# Patient Record
Sex: FEMALE | Race: WHITE | Employment: PART TIME | URBAN - METROPOLITAN AREA
[De-identification: names, ages, dates, MRNs, and addresses within clinical notes are randomized per-mention and may not be internally consistent; named-entity substitution may affect disease eponyms.]

---

## 2017-01-17 ENCOUNTER — HOSPITAL ENCOUNTER (OUTPATIENT)
Dept: PHYSICAL THERAPY | Age: 37
Discharge: HOME | End: 2017-01-17
Attending: NURSE PRACTITIONER
Payer: COMMERCIAL

## 2017-01-17 DIAGNOSIS — M54.30 SCIATICA, UNSPECIFIED LATERALITY: ICD-10-CM

## 2017-01-17 PROCEDURE — 97161 PT EVAL LOW COMPLEX 20 MIN: CPT

## 2017-01-17 NOTE — PROGRESS NOTES
Gabrielle Patle  : 1980 [facility]  [address], [address], [place]  [phone]   [phone]        OUTPATIENT PHYSICAL THERAPY:Initial Assessment 2017    ICD-10: Treatment Diagnosis: Lumbago with sciatica, right side (M54.41), Pain in thoracic spine (M54.6)  Precautions/Allergies:   Sulfa (sulfonamide antibiotics) and Sulfamethoxazole-trimethoprim 29 weeks pregnant  Fall Risk Score: 1 (? 5 = High Risk)  MD Orders: Evaluate and Treat MEDICAL/REFERRING DIAGNOSIS:  Sciatica, unspecified laterality [M54.30]   DATE OF ONSET: October   REFERRING PHYSICIAN: Sonny Crawford NP  RETURN PHYSICIAN APPOINTMENT: 17     INITIAL ASSESSMENT:  Ms. Kandace Abraham presents to physical therapy with right sided low back pain that radiates down the leg as well as mid thoracic back pain. Patient is currently 29 weeks pregnant. Physical therapy evaluation demonstrate a decrease in pain and centralization of right sided symptoms with right side glides indicating a posterior derangement with lateral compartment. She also demonstrates a centralization of mid thoracic back pain with seated extensions. She would benefit from skilled physical therapy to address her deficits and maximize her function. PROBLEM LIST (Impacting functional limitations):  1. Decreased Strength  2. Decreased ADL/Functional Activities  3. Increased Pain  4. Decreased Activity Tolerance  5. Decreased Flexibility/Joint Mobility INTERVENTIONS PLANNED:  1. Cold  2. Heat  3. Home Exercise Program (HEP)  4. Manual Therapy  5. Range of Motion (ROM)  6. Therapeutic Exercise/Strengthening   TREATMENT PLAN:  Effective Dates: 17 TO 17. Frequency/Duration: 2 times a week for 4 weeks  GOALS: (Goals have been discussed and agreed upon with patient.)  Short Term Goals  1.  Pt will be independent with comprehensive HEP to centralize and reduce upper back pain, low back pain, RLE numbness  2. Pt will report <6/10 pain in her back to demonstrate improvement in function   3. Pt will participate in core stabilization exercises to help with stabilization during ADLs with pregnancy  4. Pt will participate in LE strengthening program with weights as appropriate to help with mobility  5. Pt will demonstrate a 5 point decreased on the Modified Oswestry Low Back Disability Questionnaire to show improvement in function  Long Term Goals  1. Pt will demonstrate a 10 point decrease in the Modified Oswestry Low Back Disability Questionnaire to show improvement in function  2. Pt will report <3/10 pain in back and no numbness in her leg to demonstrate centralizing derangment   3. Pt will demonstrate 5/5 LE strength on manual muscle testing  4. Pt will be able to flex and extend lumbar spine without peripheralization of symptoms to demonstrate reduced derangement   Rehabilitation Potential For Stated Goals: Good  Regarding Madyson Edmonds's therapy, I certify that the treatment plan above will be carried out by a therapist or under their direction. Thank you for this referral,  Kristine Diop DPT     Referring Physician Signature: iLnnette Campo NP              Date                    HISTORY:   History of Present Injury/Illness (Reason for Referral):  Patient reports has been struggling with sciatica 4 years ago which started after her second child. Went to PT and it helped (stretches and press ups). Has not experienced it with her other pregnancies. Sciatica came back in October. Currently she reports numbness/tingling through right leg. Sciatica is getting worse. A few weeks ago pain under bra line which can be so severed that it masks the sciatica. Patient is 29 weeks pregnant. Has used heating pads which help. Sitting can make pain worse. Standing, bending, and lifting make the pain worse. Has used tylenol but it does not work. Went to a chiropractor a year ago.  He took an X-ray which demonstrates narrowed vertebrae. Past Medical History/Comorbidities:   Ms. Jamila Arirola  has a past medical history of Anxiety; Asthma; Chronic hypertension (2016); Collar bone fracture; Depression; Migraines; PIH (pregnancy induced hypertension); Sciatica (2013); Supervision of elderly multigravida (>=28years old at time of delivery) (2016); Supervision of high-risk pregnancy (2016); and Varicella. Ms. Jamila Arroila  has a past surgical history that includes  delivery only (2008); other surgical (); and wisdom teeth extraction. Social History/Living Environment:    Lives with  and two children. Has mild difficulty with ADLs  Prior Level of Function/Work/Activity:  Independent. Was exercising  and lost 40 pounds prior to getting pregnant  Dominant Side:         RIGHT  Other Clinical Tests:        X-rays    Previous Treatment Approaches:          PT which helped    Current Medications:    Current Outpatient Prescriptions:     albuterol (PROAIR HFA) 90 mcg/actuation inhaler, Take 2 Puffs by inhalation. , Disp: , Rfl:     budesonide (RHINOCORT AQUA) 32 mcg/actuation nasal spray, 2 Sprays by Nasal route., Disp: , Rfl:     omeprazole (PRILOSEC) 10 mg capsule, Take 10 mg by mouth daily. , Disp: , Rfl:     prenatal vit-iron fumarate-fa (PRENATAL S) 27-0.8 mg Tab tablet, Take 1 Tab by mouth daily. , Disp: , Rfl:     cetirizine (ZYRTEC) 10 mg tablet, Take  by mouth two (2) times daily as needed. , Disp: , Rfl:      Date Last Reviewed:  2017    Number of Personal Factors/Comorbidities that affect the Plan of Care: 0: LOW COMPLEXITY   EXAMINATION:   Observation/Orthostatic Postural Assessment:          In sitting patient with slouched posture. Postural correction does not change symptoms. In standing patient with increased lumbar lordosis.  Pelvic landmarks level  Palpation:          Tenderness over bilateral PSIS and low back  ROM:          Lumbar Flexion- limited due to pregnancy but increases back pain        Lumbar Extension- nil, no change in symptoms        Thoracic Extension- nil        Thoracic Rotation right and left- limited due to pregnancy  Strength:     RLE:  Hip Flexion 3/5  Knee Flexion 4/5  Knee Extension 4/5  Ankle Dorsiflexion 4/5  Ankle Plantarflexion 5/5     LLE:  Hip Flexion 4/5  Knee Flexion 5/5  Knee Extension 5/5  Ankle Dorsiflexion 5/5  Ankle Plantarflexion 5/5  Special Tests:    B=Better NB= No Better S=Same W=Worse NW= No Worse  A= Abolish P=Produces (+) =increasses  (-) = decreases  Activity     reps  during  after  Activity recheck     Activity recheck       Side Glides Right  10 decreased better     Thoracic Extension 5 increased NW centralizing                                             Neurological Screen:        Myotomes:  intact        Dermatomes:  intact     Body Structures Involved:  1. Bones  2. Joints  3. Muscles  4. Ligaments Body Functions Affected:  1. Sensory/Pain  2. Neuromusculoskeletal  3. Movement Related Activities and Participation Affected:  1. General Tasks and Demands  2. Mobility  3. Self Care  4. Domestic Life  5. Community, Social and [place]   Number of elements that affect the Plan of Care: 1-2: LOW COMPLEXITY   CLINICAL PRESENTATION:   Presentation: Stable and uncomplicated: LOW COMPLEXITY   CLINICAL DECISION MAKING:   Outcome Measure: Tool Used: Modified Oswestry Low Back Pain Questionnaire  Score:  Initial: 22/50  Most Recent: X/50 (Date: -- )       Medical Necessity:   · Patient demonstrates good rehab potential due to higher previous functional level. Reason for Services/Other Comments:  · Patient continues to require skilled intervention due to pain limiting mobiilty.    Use of outcome tool(s) and clinical judgement create a POC that gives a: Clear prediction of patient's progress: LOW COMPLEXITY   TREATMENT:   (In addition to Assessment/Re-Assessment sessions the following treatments were rendered)  THERAPEUTIC EXERCISE: ( minutes):  Exercises per grid below to improve mobility and strength. Required minimal visual, verbal and manual cues to promote proper body alignment and promote proper body posture. Progressed resistance, range and repetitions as indicated. Date:  1-17-17 Date:   Date:     Activity/Exercise Parameters Parameters Parameters   Patient Education Discussed HEP (side glides, supported sitting, heat) and POC                                               Treatment/Session Assessment:  See assessment above. · Pre-treatment Symptoms:    · Pain: Initial:    8/10 Post Session:  4/10 ·   Compliance with Program/Exercises: compliant all of the time. · Recommendations/Intent for next treatment session: \"Next visit will focus on advancements to more challenging activities and reduction in assistance provided\".   Total Treatment Duration:  PT Patient Time In/Time Out  Time In: 0900  Time Out: [facility], DPT

## 2017-01-17 NOTE — PROGRESS NOTES
Ambulatory/Rehab Services H2 Model Falls Risk Assessment    Risk Factor Pts. ·   Confusion/Disorientation/Impulsivity  []    4 ·   Symptomatic Depression  []   2 ·   Altered Elimination  []   1 ·   Dizziness/Vertigo  []   1 ·   Gender (Male)  []   1 ·   Any administered antiepileptics (anticonvulsants):  []   2 ·   Any administered benzodiazepines:  []   1 ·   Visual Impairment (specify):  []   1 ·   Portable Oxygen Use  []   1 ·   Orthostatic ? BP  []   1 ·   History of Recent Falls (within 3 mos.)  []   5     Ability to Rise from Chair (choose one) Pts. ·   Ability to rise in a single movement  []   0 ·   Pushes up, successful in one attempt  [x]   1 ·   Multiple attempts, but successful  []   3 ·   Unable to rise without assistance  []   4   Total: (5 or greater = High Risk) 1     Falls Prevention Plan:   []                Physical Limitations to Exercise (specify):   []                Mobility Assistance Device (type):   []                Exercise/Equipment Adaptation (specify):    ©2010 [facility] [place] Patent #[id number].  Federal Law prohibits the replication, distribution or use without written permission from [facility] [employer]

## 2017-01-24 ENCOUNTER — HOSPITAL ENCOUNTER (OUTPATIENT)
Dept: PHYSICAL THERAPY | Age: 37
Discharge: HOME | End: 2017-01-24
Attending: NURSE PRACTITIONER
Payer: COMMERCIAL

## 2017-01-24 PROCEDURE — 97110 THERAPEUTIC EXERCISES: CPT

## 2017-01-24 NOTE — PROGRESS NOTES
Elizabeth Vasquez  : 1980 [facility]  [address], [address], [place]  [phone]   [phone]        OUTPATIENT PHYSICAL THERAPY:Daily Note 2017    ICD-10: Treatment Diagnosis: Lumbago with sciatica, right side (M54.41), Pain in thoracic spine (M54.6)  Precautions/Allergies:   Sulfa (sulfonamide antibiotics) and Sulfamethoxazole-trimethoprim 29 weeks pregnant  Fall Risk Score: 1 (? 5 = High Risk)  MD Orders: Evaluate and Treat MEDICAL/REFERRING DIAGNOSIS:  Sciatica   DATE OF ONSET: October   REFERRING PHYSICIAN: Bora Stoddard NP  RETURN PHYSICIAN APPOINTMENT: 17     INITIAL ASSESSMENT:  Ms. Shanell Granados presents to physical therapy with right sided low back pain that radiates down the leg as well as mid thoracic back pain. Patient is currently 29 weeks pregnant. Physical therapy evaluation demonstrate a decrease in pain and centralization of right sided symptoms with right side glides indicating a posterior derangement with lateral compartment. She also demonstrates a centralization of mid thoracic back pain with seated extensions. She would benefit from skilled physical therapy to address her deficits and maximize her function. PROBLEM LIST (Impacting functional limitations):  1. Decreased Strength  2. Decreased ADL/Functional Activities  3. Increased Pain  4. Decreased Activity Tolerance  5. Decreased Flexibility/Joint Mobility INTERVENTIONS PLANNED:  1. Cold  2. Heat  3. Home Exercise Program (HEP)  4. Manual Therapy  5. Range of Motion (ROM)  6. Therapeutic Exercise/Strengthening   TREATMENT PLAN:  Effective Dates: 17 TO 17. Frequency/Duration: 2 times a week for 4 weeks  GOALS: (Goals have been discussed and agreed upon with patient.)  Short Term Goals  1. Pt will be independent with comprehensive HEP to centralize and reduce upper back pain, low back pain, RLE numbness  2.  Pt will report <6/10 pain in her back to demonstrate improvement in function   3. Pt will participate in core stabilization exercises to help with stabilization during ADLs with pregnancy  4. Pt will participate in LE strengthening program with weights as appropriate to help with mobility  5. Pt will demonstrate a 5 point decreased on the Modified Oswestry Low Back Disability Questionnaire to show improvement in function  Long Term Goals  1. Pt will demonstrate a 10 point decrease in the Modified Oswestry Low Back Disability Questionnaire to show improvement in function  2. Pt will report <3/10 pain in back and no numbness in her leg to demonstrate centralizing derangment   3. Pt will demonstrate 5/5 LE strength on manual muscle testing  4. Pt will be able to flex and extend lumbar spine without peripheralization of symptoms to demonstrate reduced derangement   Rehabilitation Potential For Stated Goals: Good  Regarding Mayur Edmonds's therapy, I certify that the treatment plan above will be carried out by a therapist or under their direction. Thank you for this referral,  Radha Sawyer DPT     Referring Physician Signature: Albino Closs, NP              Date                    HISTORY:   History of Present Injury/Illness (Reason for Referral):  Patient reports has been struggling with sciatica 4 years ago which started after her second child. Went to PT and it helped (stretches and press ups). Has not experienced it with her other pregnancies. Sciatica came back in October. Currently she reports numbness/tingling through right leg. Sciatica is getting worse. A few weeks ago pain under bra line which can be so severed that it masks the sciatica. Patient is 29 weeks pregnant. Has used heating pads which help. Sitting can make pain worse. Standing, bending, and lifting make the pain worse. Has used tylenol but it does not work. Went to a chiropractor a year ago. He took an X-ray which demonstrates narrowed vertebrae.    Past Medical History/Comorbidities:   Ms. Zeke Feng  has a past medical history of Anxiety; Asthma; Chronic hypertension (2016); Collar bone fracture; Depression; Migraines; PIH (pregnancy induced hypertension); Sciatica (2013); Supervision of elderly multigravida (>=28years old at time of delivery) (2016); Supervision of high-risk pregnancy (2016); and Varicella. Ms. Zeke Feng  has a past surgical history that includes  delivery only (2008); other surgical (); and wisdom teeth extraction. Social History/Living Environment:    Lives with  and two children. Has mild difficulty with ADLs  Prior Level of Function/Work/Activity:  Independent. Was exercising  and lost 40 pounds prior to getting pregnant  Dominant Side:         RIGHT  Other Clinical Tests:        X-rays    Previous Treatment Approaches:          PT which helped    Current Medications:    Current Outpatient Prescriptions:     albuterol (PROAIR HFA) 90 mcg/actuation inhaler, Take 2 Puffs by inhalation. , Disp: , Rfl:     budesonide (RHINOCORT AQUA) 32 mcg/actuation nasal spray, 2 Sprays by Nasal route., Disp: , Rfl:     omeprazole (PRILOSEC) 10 mg capsule, Take 10 mg by mouth daily. , Disp: , Rfl:     prenatal vit-iron fumarate-fa (PRENATAL S) 27-0.8 mg Tab tablet, Take 1 Tab by mouth daily. , Disp: , Rfl:     cetirizine (ZYRTEC) 10 mg tablet, Take  by mouth two (2) times daily as needed. , Disp: , Rfl:      Date Last Reviewed:  2017    Number of Personal Factors/Comorbidities that affect the Plan of Care: 0: LOW COMPLEXITY   EXAMINATION:   Observation/Orthostatic Postural Assessment:          In sitting patient with slouched posture. Postural correction does not change symptoms. In standing patient with increased lumbar lordosis.  Pelvic landmarks level  Palpation:          Tenderness over bilateral PSIS and low back  ROM:          Lumbar Flexion- limited due to pregnancy but increases back pain        Lumbar Extension- nil, no change in symptoms        Thoracic Extension- nil        Thoracic Rotation right and left- limited due to pregnancy  Strength:     RLE:  Hip Flexion 3/5  Knee Flexion 4/5  Knee Extension 4/5  Ankle Dorsiflexion 4/5  Ankle Plantarflexion 5/5     LLE:  Hip Flexion 4/5  Knee Flexion 5/5  Knee Extension 5/5  Ankle Dorsiflexion 5/5  Ankle Plantarflexion 5/5  Special Tests:    B=Better NB= No Better S=Same W=Worse NW= No Worse  A= Abolish P=Produces (+) =increasses  (-) = decreases  Activity     reps  during  after  Activity recheck     Activity recheck       Side Glides Right  10 decreased better     Thoracic Extension 5 increased NW centralizing                                             Neurological Screen:        Myotomes:  intact        Dermatomes:  intact     Body Structures Involved:  1. Bones  2. Joints  3. Muscles  4. Ligaments Body Functions Affected:  1. Sensory/Pain  2. Neuromusculoskeletal  3. Movement Related Activities and Participation Affected:  1. General Tasks and Demands  2. Mobility  3. Self Care  4. Domestic Life  5. Community, Social and [place]   Number of elements that affect the Plan of Care: 1-2: LOW COMPLEXITY   CLINICAL PRESENTATION:   Presentation: Stable and uncomplicated: LOW COMPLEXITY   CLINICAL DECISION MAKING:   Outcome Measure: Tool Used: Modified Oswestry Low Back Pain Questionnaire  Score:  Initial: 22/50  Most Recent: X/50 (Date: -- )       Medical Necessity:   · Patient demonstrates good rehab potential due to higher previous functional level. Reason for Services/Other Comments:  · Patient continues to require skilled intervention due to pain limiting mobiilty. Use of outcome tool(s) and clinical judgement create a POC that gives a: Clear prediction of patient's progress: LOW COMPLEXITY   TREATMENT:   Present Symptoms: Patient reports her pain is a lot better. She reports she has occasional pain in the leg but overall no back or leg pain today.  She reports compliance with her HEP  (In addition to Assessment/Re-Assessment sessions the following treatments were rendered)  THERAPEUTIC EXERCISE: (40 minutes):  Exercises per grid below to improve mobility and strength. Required minimal visual, verbal and manual cues to promote proper body alignment and promote proper body posture. Progressed resistance, range and repetitions as indicated. Date:  1-17-17 Date:  1-24-17 Date:     Activity/Exercise Parameters Parameters Parameters   Patient Education Discussed HEP (side glides, supported sitting, heat) and POC     Nu Step  Level 1 x 10 minutes    Side Glides Right  x10      Cat/Camel    x10    Quadruped with leg extension    x10 each side    Quadruped with alternating leg and arm extension  x10 each side    Pelvic Tilts  5 sec hold x 10 in hooklying    Clamshells  x20 each side      Hooklying ball squeezes  5 sec hold x 10                Treatment/Session Assessment:  Patient demonstrates improvement in symptoms from initial evaluation. She was able to tolerate all exercises except for lying on her back increased pressure into her buttocks. Discussed her HEP  · Pre-treatment Symptoms:    · Pain: Initial:  Pain Intensity 1: 0 0/10 Post Session:  0/10 ·   Compliance with Program/Exercises: compliant all of the time. · Recommendations/Intent for next treatment session: \"Next visit will focus on advancements to more challenging activities and reduction in assistance provided\".   Total Treatment Duration:  PT Patient Time In/Time Out  Time In: 0945  Time Out: [address], [facility]

## 2017-01-26 ENCOUNTER — APPOINTMENT (OUTPATIENT)
Dept: PHYSICAL THERAPY | Age: 37
End: 2017-01-26
Attending: NURSE PRACTITIONER
Payer: COMMERCIAL

## 2017-01-30 ENCOUNTER — APPOINTMENT (OUTPATIENT)
Dept: PHYSICAL THERAPY | Age: 37
End: 2017-01-30
Attending: NURSE PRACTITIONER
Payer: COMMERCIAL

## 2017-02-02 ENCOUNTER — APPOINTMENT (OUTPATIENT)
Dept: PHYSICAL THERAPY | Age: 37
End: 2017-02-02
Attending: NURSE PRACTITIONER
Payer: COMMERCIAL

## 2017-02-07 ENCOUNTER — HOSPITAL ENCOUNTER (OUTPATIENT)
Dept: PHYSICAL THERAPY | Age: 37
Discharge: HOME | End: 2017-02-07
Attending: NURSE PRACTITIONER
Payer: COMMERCIAL

## 2017-02-07 PROCEDURE — 97110 THERAPEUTIC EXERCISES: CPT

## 2017-02-07 NOTE — PROGRESS NOTES
Jakob Leonel  : 1980 [facility] at [facility]  [address], [address], [place]  [phone]   [phone]        OUTPATIENT PHYSICAL THERAPY:Daily Note 2017    ICD-10: Treatment Diagnosis: Lumbago with sciatica, right side (M54.41), Pain in thoracic spine (M54.6)  Precautions/Allergies:   Sulfa (sulfonamide antibiotics) and Sulfamethoxazole-trimethoprim 29 weeks pregnant  Fall Risk Score: 1 (? 5 = High Risk)  MD Orders: Evaluate and Treat MEDICAL/REFERRING DIAGNOSIS:  Sciatica   DATE OF ONSET: October   REFERRING PHYSICIAN: Rizwan Vela NP  RETURN PHYSICIAN APPOINTMENT: 17     INITIAL ASSESSMENT:  Ms. Tushar May presents to physical therapy with right sided low back pain that radiates down the leg as well as mid thoracic back pain. Patient is currently 29 weeks pregnant. Physical therapy evaluation demonstrate a decrease in pain and centralization of right sided symptoms with right side glides indicating a posterior derangement with lateral compartment. She also demonstrates a centralization of mid thoracic back pain with seated extensions. She would benefit from skilled physical therapy to address her deficits and maximize her function. PROBLEM LIST (Impacting functional limitations):  1. Decreased Strength  2. Decreased ADL/Functional Activities  3. Increased Pain  4. Decreased Activity Tolerance  5. Decreased Flexibility/Joint Mobility INTERVENTIONS PLANNED:  1. Cold  2. Heat  3. Home Exercise Program (HEP)  4. Manual Therapy  5. Range of Motion (ROM)  6. Therapeutic Exercise/Strengthening   TREATMENT PLAN:  Effective Dates: 17 TO 17. Frequency/Duration: 2 times a week for 4 weeks  GOALS: (Goals have been discussed and agreed upon with patient.)  Short Term Goals  1. Pt will be independent with comprehensive HEP to centralize and reduce upper back pain, low back pain, RLE numbness  2.  Pt will report <6/10 pain in her back to demonstrate improvement in function   3. Pt will participate in core stabilization exercises to help with stabilization during ADLs with pregnancy  4. Pt will participate in LE strengthening program with weights as appropriate to help with mobility  5. Pt will demonstrate a 5 point decreased on the Modified Oswestry Low Back Disability Questionnaire to show improvement in function  Long Term Goals  1. Pt will demonstrate a 10 point decrease in the Modified Oswestry Low Back Disability Questionnaire to show improvement in function  2. Pt will report <3/10 pain in back and no numbness in her leg to demonstrate centralizing derangment   3. Pt will demonstrate 5/5 LE strength on manual muscle testing  4. Pt will be able to flex and extend lumbar spine without peripheralization of symptoms to demonstrate reduced derangement   Rehabilitation Potential For Stated Goals: Good  Regarding Kristie Edmonds's therapy, I certify that the treatment plan above will be carried out by a therapist or under their direction. Thank you for this referral,  Merlinda Ear     Referring Physician Signature: Jennifer Cantu NP              Date                    HISTORY:   History of Present Injury/Illness (Reason for Referral):  Patient reports has been struggling with sciatica 4 years ago which started after her second child. Went to PT and it helped (stretches and press ups). Has not experienced it with her other pregnancies. Sciatica came back in October. Currently she reports numbness/tingling through right leg. Sciatica is getting worse. A few weeks ago pain under bra line which can be so severed that it masks the sciatica. Patient is 29 weeks pregnant. Has used heating pads which help. Sitting can make pain worse. Standing, bending, and lifting make the pain worse. Has used tylenol but it does not work. Went to a chiropractor a year ago. He took an X-ray which demonstrates narrowed vertebrae.    Past Medical History/Comorbidities: Ms. Keturah Loja  has a past medical history of Anxiety; Asthma; Chronic hypertension (2016); Collar bone fracture; Depression; Migraines; PIH (pregnancy induced hypertension); Sciatica (2013); Supervision of elderly multigravida (>=28years old at time of delivery) (2016); Supervision of high-risk pregnancy (2016); and Varicella. Ms. Keturah Loja  has a past surgical history that includes  delivery only (2008); other surgical (); and wisdom teeth extraction. Social History/Living Environment:    Lives with  and two children. Has mild difficulty with ADLs  Prior Level of Function/Work/Activity:  Independent. Was exercising  and lost 40 pounds prior to getting pregnant  Dominant Side:         RIGHT  Other Clinical Tests:        X-rays    Previous Treatment Approaches:          PT which helped    Current Medications:    Current Outpatient Prescriptions:     albuterol (PROAIR HFA) 90 mcg/actuation inhaler, Take 2 Puffs by inhalation. , Disp: , Rfl:     budesonide (RHINOCORT AQUA) 32 mcg/actuation nasal spray, 2 Sprays by Nasal route., Disp: , Rfl:     omeprazole (PRILOSEC) 10 mg capsule, Take 10 mg by mouth daily. , Disp: , Rfl:     prenatal vit-iron fumarate-fa (PRENATAL S) 27-0.8 mg Tab tablet, Take 1 Tab by mouth daily. , Disp: , Rfl:     cetirizine (ZYRTEC) 10 mg tablet, Take  by mouth two (2) times daily as needed. , Disp: , Rfl:      Date Last Reviewed:  2017    Number of Personal Factors/Comorbidities that affect the Plan of Care: 0: LOW COMPLEXITY   EXAMINATION:   Observation/Orthostatic Postural Assessment:          In sitting patient with slouched posture. Postural correction does not change symptoms. In standing patient with increased lumbar lordosis.  Pelvic landmarks level  Palpation:          Tenderness over bilateral PSIS and low back  ROM:          Lumbar Flexion- limited due to pregnancy but increases back pain        Lumbar Extension- nil, no change in symptoms        Thoracic Extension- nil        Thoracic Rotation right and left- limited due to pregnancy  Strength:     RLE:  Hip Flexion 3/5  Knee Flexion 4/5  Knee Extension 4/5  Ankle Dorsiflexion 4/5  Ankle Plantarflexion 5/5     LLE:  Hip Flexion 4/5  Knee Flexion 5/5  Knee Extension 5/5  Ankle Dorsiflexion 5/5  Ankle Plantarflexion 5/5  Special Tests:    B=Better NB= No Better S=Same W=Worse NW= No Worse  A= Abolish P=Produces (+) =increasses  (-) = decreases  Activity     reps  during  after  Activity recheck     Activity recheck       Side Glides Right  10 decreased better     Thoracic Extension 5 increased NW centralizing                                             Neurological Screen:        Myotomes:  intact        Dermatomes:  intact     Body Structures Involved:  1. Bones  2. Joints  3. Muscles  4. Ligaments Body Functions Affected:  1. Sensory/Pain  2. Neuromusculoskeletal  3. Movement Related Activities and Participation Affected:  1. General Tasks and Demands  2. Mobility  3. Self Care  4. Domestic Life  5. Community, Social and [place] [facility]   Number of elements that affect the Plan of Care: 1-2: LOW COMPLEXITY   CLINICAL PRESENTATION:   Presentation: Stable and uncomplicated: LOW COMPLEXITY   CLINICAL DECISION MAKING:   Outcome Measure: Tool Used: Modified Oswestry Low Back Pain Questionnaire  Score:  Initial: 22/50  Most Recent: X/50 (Date: -- )       Medical Necessity:   · Patient demonstrates good rehab potential due to higher previous functional level. Reason for Services/Other Comments:  · Patient continues to require skilled intervention due to pain limiting mobiilty. Use of outcome tool(s) and clinical judgement create a POC that gives a: Clear prediction of patient's progress: LOW COMPLEXITY   TREATMENT:   Present Symptoms: Patient reports going to her OB and being told that her relaxin levels are higher than normal due to pregnancy.  She reports feeling too much pain when lifting her legs so she doesn't do any of those exercises. (In addition to Assessment/Re-Assessment sessions the following treatments were rendered)  THERAPEUTIC EXERCISE: (40 minutes):  Exercises per grid below to improve mobility and strength. Required minimal visual, verbal and manual cues to promote proper body alignment and promote proper body posture. Progressed resistance, range and repetitions as indicated. Date:  1-17-17 Date:  1-24-17 Date:  2-7-17   Activity/Exercise Parameters Parameters Parameters   Patient Education Discussed HEP (side glides, supported sitting, heat) and POC     Nu Step  Level 1 x 10 minutes Level 1.5 x 10 minutes    Side Glides Right  x10      Cat/Camel    x10 X 15   Quadruped with leg extension    x10 each side    Quadruped with alternating leg and arm extension  x10 each side    Pelvic Tilts  5 sec hold x 10 in hooklying 5 sec hold x 10 in hooklying with inclined therapy table    Clamshells  x20 each side   X 15 each side with yellow TB   Hooklying ball squeezes  5 sec hold x 10 5 sec hold x 15    Glut squeeze   5 sec hold x 15   Isometric abdominal heel press and opposite elbow press   5 sec hold x 15 each side          Treatment/Session Assessment:  Patient was limited by her pelvic pain. She reported pain after hooklying so we moved to inclined therapy table. She required cuing to breathe during pelvic tilts. · Pre-treatment Symptoms:    · Pain: Initial:  Pain Intensity 1: 0 0/10 Post Session:  0/10 ·   Compliance with Program/Exercises: compliant all of the time. · Recommendations/Intent for next treatment session: \"Next visit will focus on advancements to more challenging activities and reduction in assistance provided\".   Total Treatment Duration:  PT Patient Time In/Time Out  Time In: 0900  Time Out: [address]

## 2017-02-09 ENCOUNTER — APPOINTMENT (OUTPATIENT)
Dept: PHYSICAL THERAPY | Age: 37
End: 2017-02-09
Attending: NURSE PRACTITIONER
Payer: COMMERCIAL

## 2017-02-14 ENCOUNTER — HOSPITAL ENCOUNTER (OUTPATIENT)
Dept: PHYSICAL THERAPY | Age: 37
Discharge: HOME | End: 2017-02-14
Attending: NURSE PRACTITIONER
Payer: COMMERCIAL

## 2017-02-14 PROCEDURE — 97110 THERAPEUTIC EXERCISES: CPT

## 2017-02-14 NOTE — PROGRESS NOTES
Evelin Gordon  : 1980 [facility] at [facility]  [address], [address], [place]  [phone]   [phone]        OUTPATIENT PHYSICAL THERAPY:Discharge 2017    ICD-10: Treatment Diagnosis: Lumbago with sciatica, right side (M54.41), Pain in thoracic spine (M54.6)  Precautions/Allergies:   Sulfa (sulfonamide antibiotics) and Sulfamethoxazole-trimethoprim 29 weeks pregnant  Fall Risk Score: 1 (? 5 = High Risk)  MD Orders: Evaluate and Treat MEDICAL/REFERRING DIAGNOSIS:  Sciatica   DATE OF ONSET: October   REFERRING PHYSICIAN: aMti Vasquez NP  RETURN PHYSICIAN APPOINTMENT: 17     DISCHARGE ASSESSMENT:  Ms. Lilian Tian was seen for 4 physical therapy visits since 17. She demonstrates weakness in her right leg due to pain. She demonstrates centralization of right sided symptoms with right side glides but becomes painful in her pelvic region due to the hormones from pregnancy. She was educated on positioning, modalities, and a few exercises to help her reduce the pain. Due to lack of progress in therapy, patient and therapist were in agreement to discharge at this time and if symptoms persist post-pregnancy, patient was instructed to call M.D. in order to return to therapy. TREATMENT PLAN:    GOALS: (Goals have been discussed and agreed upon with patient.)  Short Term Goals  1. Pt will be independent with comprehensive HEP to centralize and reduce upper back pain, low back pain, RLE numbness Goal Met 17  2. Pt will report <6/10 pain in her back to demonstrate improvement in function  Goal Not Met 17  3. Pt will participate in core stabilization exercises to help with stabilization during ADLs with pregnancy Goal Met 17  4. Pt will participate in LE strengthening program with weights as appropriate to help with mobility Goal Not Met 17  5.  Pt will demonstrate a 5 point decreased on the Modified Oswestry Low Back Disability Questionnaire to show improvement in function Goal Not Met 2-14-17  Long Term Goals  1. Pt will demonstrate a 10 point decrease in the Modified Oswestry Low Back Disability Questionnaire to show improvement in function Goal Not Met 2-14-17  2. Pt will report <3/10 pain in back and no numbness in her leg to demonstrate centralizing derangment Goal Not Met 2-14-17  3. Pt will demonstrate 5/5 LE strength on manual muscle testing Goal Not Met 2-14-17  4. Pt will be able to flex and extend lumbar spine without peripheralization of symptoms to demonstrate reduced derangement Goal Not Met 2-14-17  Regarding Alysia Edmonds's therapy, I certify that the treatment plan above will be carried out by a therapist or under their direction. Thank you for this referral,  Iva Frias     Referring Physician Signature: Juve Hong NP              Date                    HISTORY:   History of Present Injury/Illness (Reason for Referral):  Patient reports has been struggling with sciatica 4 years ago which started after her second child. Went to PT and it helped (stretches and press ups). Has not experienced it with her other pregnancies. Sciatica came back in October. Currently she reports numbness/tingling through right leg. Sciatica is getting worse. A few weeks ago pain under bra line which can be so severed that it masks the sciatica. Patient is 29 weeks pregnant. Has used heating pads which help. Sitting can make pain worse. Standing, bending, and lifting make the pain worse. Has used tylenol but it does not work. Went to a chiropractor a year ago. He took an X-ray which demonstrates narrowed vertebrae. Past Medical History/Comorbidities:   Ms. Jamila Arriola  has a past medical history of Anxiety; Asthma; Chronic hypertension (9/29/2016); Collar bone fracture; Depression; Migraines; PIH (pregnancy induced hypertension); Sciatica (9/5/2013);  Supervision of elderly multigravida (>=28years old at time of delivery) (2016); Supervision of high-risk pregnancy (2016); and Varicella. Ms. Lavell Diallo  has a past surgical history that includes  delivery only (2008); other surgical (); and wisdom teeth extraction. Social History/Living Environment:    Lives with  and two children. Has mild difficulty with ADLs  Prior Level of Function/Work/Activity:  Independent. Was exercising  and lost 40 pounds prior to getting pregnant  Dominant Side:         RIGHT  Other Clinical Tests:        X-rays    Previous Treatment Approaches:          PT which helped    Current Medications:    Current Outpatient Prescriptions:     albuterol (PROAIR HFA) 90 mcg/actuation inhaler, Take 2 Puffs by inhalation. , Disp: , Rfl:     budesonide (RHINOCORT AQUA) 32 mcg/actuation nasal spray, 2 Sprays by Nasal route., Disp: , Rfl:     omeprazole (PRILOSEC) 10 mg capsule, Take 10 mg by mouth daily. , Disp: , Rfl:     prenatal vit-iron fumarate-fa (PRENATAL S) 27-0.8 mg Tab tablet, Take 1 Tab by mouth daily. , Disp: , Rfl:     cetirizine (ZYRTEC) 10 mg tablet, Take  by mouth two (2) times daily as needed. , Disp: , Rfl:      Date Last Reviewed:  2017    Number of Personal Factors/Comorbidities that affect the Plan of Care: 0: LOW COMPLEXITY   EXAMINATION:   Observation/Orthostatic Postural Assessment:          In sitting patient with slouched posture. Postural correction does not change symptoms. In standing patient with increased lumbar lordosis.  Pelvic landmarks level  Palpation:          Tenderness over bilateral PSIS and low back  ROM:          Lumbar Flexion- limited due to pregnancy but increases back pain        Lumbar Extension- nil, no change in symptoms        Thoracic Extension- nil        Thoracic Rotation right and left- limited due to pregnancy  Strength:  2-14-17   RLE:  Hip Flexion 4/5  Knee Flexion 4/5  Knee Extension 4/5  Ankle Dorsiflexion 4/5  Ankle Plantarflexion 5/5     LLE:  Hip Flexion 4/5  Knee Flexion 5/5  Knee Extension 5/5  Ankle Dorsiflexion 5/5  Ankle Plantarflexion 5/5  Special Tests:    B=Better NB= No Better S=Same W=Worse NW= No Worse  A= Abolish P=Produces (+) =increasses  (-) = decreases  Activity     reps  during  after  Activity recheck     Activity recheck       Side Glides Right  10 decreased better     Thoracic Extension 5 increased NW centralizing                                             Neurological Screen:        Myotomes:  intact        Dermatomes:  intact          Number of elements that affect the Plan of Care: 1-2: LOW COMPLEXITY   CLINICAL PRESENTATION:   Presentation: Stable and uncomplicated: LOW COMPLEXITY   CLINICAL DECISION MAKING:   Outcome Measure: Tool Used: Modified Oswestry Low Back Pain Questionnaire  Score:  Initial: 22/50  Most Recent: 25/50 (Date: 2-14-17)      Use of outcome tool(s) and clinical judgement create a POC that gives a: Clear prediction of patient's progress: LOW COMPLEXITY   TREATMENT:     (In addition to Assessment/Re-Assessment sessions the following treatments were rendered)  THERAPEUTIC EXERCISE: (25 minutes):  Exercises per grid below to improve mobility and strength. Required minimal visual, verbal and manual cues to promote proper body alignment and promote proper body posture. Progressed resistance, range and repetitions as indicated.    Date:  1-17-17 Date:  1-24-17 Date:  2-7-17 Date:  2-14-17   Activity/Exercise Parameters Parameters Parameters    Patient Education Discussed HEP (side glides, supported sitting, heat) and POC      Nu Step  Level 1 x 10 minutes Level 1.5 x 10 minutes  Level 1.5 x 10 minutes    Side Glides Right  x10       Cat/Camel    x10 X 15    Quadruped with leg extension    x10 each side     Quadruped with alternating leg and arm extension  x10 each side     Pelvic Tilts  5 sec hold x 10 in hooklying 5 sec hold x 10 in hooklying with inclined therapy table     Clamshells  x20 each side   X 15 each side with yellow TB Hooklying ball squeezes  5 sec hold x 10 5 sec hold x 15     Glut squeeze   5 sec hold x 15    Isometric abdominal heel press and opposite elbow press   5 sec hold x 15 each side     Seated slouch/over-correct    X 10   Kneeling on foam pad with elbows on therapy table - modified prone extensions    X 10 - range limited due to groin pain          Treatment/Session Assessment: See assessment above     PT Patient Time In/Time Out  Time In: 0900  Time Out: [address]

## 2017-02-16 ENCOUNTER — APPOINTMENT (OUTPATIENT)
Dept: PHYSICAL THERAPY | Age: 37
End: 2017-02-16
Attending: NURSE PRACTITIONER
Payer: COMMERCIAL

## 2017-02-21 ENCOUNTER — HOSPITAL ENCOUNTER (OUTPATIENT)
Age: 37
Setting detail: OBSERVATION
Discharge: HOME | End: 2017-02-22
Attending: OBSTETRICS & GYNECOLOGY | Admitting: OBSTETRICS & GYNECOLOGY
Payer: COMMERCIAL

## 2017-02-21 DIAGNOSIS — I10 CHRONIC HYPERTENSION: ICD-10-CM

## 2017-02-21 DIAGNOSIS — O09.523 SUPERVISION OF ELDERLY MULTIGRAVIDA (>=35 YEARS OLD AT TIME OF DELIVERY), THIRD TRIMESTER: ICD-10-CM

## 2017-02-21 LAB
ALBUMIN SERPL BCP-MCNC: 2.7 G/DL (ref 3.5–5)
ALBUMIN/GLOB SERPL: 0.7 {RATIO} (ref 1.2–3.5)
ALP SERPL-CCNC: 104 U/L (ref 50–136)
ALT SERPL-CCNC: 14 U/L (ref 12–65)
ANION GAP BLD CALC-SCNC: 11 MMOL/L (ref 7–16)
AST SERPL W P-5'-P-CCNC: 15 U/L (ref 15–37)
BILIRUB SERPL-MCNC: 0.2 MG/DL (ref 0.2–1.1)
BUN SERPL-MCNC: 13 MG/DL (ref 6–23)
CALCIUM SERPL-MCNC: 8.6 MG/DL (ref 8.3–10.4)
CHLORIDE SERPL-SCNC: 102 MMOL/L (ref 98–107)
CO2 SERPL-SCNC: 25 MMOL/L (ref 21–32)
CREAT SERPL-MCNC: 0.65 MG/DL (ref 0.6–1)
ERYTHROCYTE [DISTWIDTH] IN BLOOD BY AUTOMATED COUNT: 13.8 % (ref 11.9–14.6)
GLOBULIN SER CALC-MCNC: 3.9 G/DL (ref 2.3–3.5)
GLUCOSE SERPL-MCNC: 74 MG/DL (ref 65–100)
HCT VFR BLD AUTO: 33.6 % (ref 35.8–46.3)
HGB BLD-MCNC: 10.5 G/DL (ref 11.7–15.4)
LDH SERPL L TO P-CCNC: 142 U/L (ref 100–190)
MCH RBC QN AUTO: 27.4 PG (ref 26.1–32.9)
MCHC RBC AUTO-ENTMCNC: 31.3 G/DL (ref 31.4–35)
MCV RBC AUTO: 87.7 FL (ref 79.6–97.8)
PLATELET # BLD AUTO: 180 K/UL (ref 150–450)
PMV BLD AUTO: 10 FL (ref 10.8–14.1)
POTASSIUM SERPL-SCNC: 4.1 MMOL/L (ref 3.5–5.1)
PROT SERPL-MCNC: 6.6 G/DL (ref 6.3–8.2)
RBC # BLD AUTO: 3.83 M/UL (ref 4.05–5.25)
SODIUM SERPL-SCNC: 138 MMOL/L (ref 136–145)
URATE SERPL-MCNC: 3.5 MG/DL (ref 2.6–6)
WBC # BLD AUTO: 12 K/UL (ref 4.3–11.1)

## 2017-02-21 PROCEDURE — 85027 COMPLETE CBC AUTOMATED: CPT | Performed by: OBSTETRICS & GYNECOLOGY

## 2017-02-21 PROCEDURE — 99218 HC RM OBSERVATION: CPT

## 2017-02-21 PROCEDURE — 83615 LACTATE (LD) (LDH) ENZYME: CPT | Performed by: OBSTETRICS & GYNECOLOGY

## 2017-02-21 PROCEDURE — 80053 COMPREHEN METABOLIC PANEL: CPT | Performed by: OBSTETRICS & GYNECOLOGY

## 2017-02-21 PROCEDURE — G0378 HOSPITAL OBSERVATION PER HR: HCPCS

## 2017-02-21 PROCEDURE — 36415 COLL VENOUS BLD VENIPUNCTURE: CPT | Performed by: OBSTETRICS & GYNECOLOGY

## 2017-02-21 PROCEDURE — 84550 ASSAY OF BLOOD/URIC ACID: CPT | Performed by: OBSTETRICS & GYNECOLOGY

## 2017-02-21 PROCEDURE — 84156 ASSAY OF PROTEIN URINE: CPT | Performed by: OBSTETRICS & GYNECOLOGY

## 2017-02-21 PROCEDURE — 59025 FETAL NON-STRESS TEST: CPT

## 2017-02-21 NOTE — PROGRESS NOTES
[facility] [address] [facility] [facility] [address] [facility]. Admission assessment complete. No abnormalities noted.

## 2017-02-21 NOTE — H&P
History & Physical    Name: Yashira Pack MRN: [id number]  SSN: [id number]    YOB: 1980  Age: 29 y.o. Sex: female      Subjective:     Reason for Admission:  Pregnancy and elevated blood pressure     History of Present Illness: Ms. Campos Morales is a 29 y.o.  female with an estimated gestational age of 34w3d with Estimated Date of Delivery: 4/3/17. Patient was seen in office today and intial blood pressure was 172 / 98 and repeat was 150/92. Nausea yesterday. Swelling on sat for her baby shower. She has hx of chronic hypertension. OB History    Para Term  AB SAB TAB Ectopic Multiple Living   3 2 1       2      # Outcome Date GA Lbr Honorio/2nd Weight Sex Delivery Anes PTL Lv   3 Current            2 Term 10/10/11 39w1d  8 lb 3.2 oz (3.72 kg) F  LO SPINAL AN N Y   1 Para 08   7 lb 2 oz (3.232 kg) F    Y        Past Medical History   Diagnosis Date    Anxiety     Asthma     Chronic hypertension 2016    Collar bone fracture     Depression     Migraines      OTC meds only    PIH (pregnancy induced hypertension)      Was taking Propranolol    Sciatica 2013    Supervision of elderly multigravida (>=28years old at time of delivery) 2016    Supervision of high-risk pregnancy 2016    Varicella      Past Surgical History   Procedure Laterality Date    Pr  delivery only  04/03/2008     x2    Hx other surgical  2001     Bone Marrow BX    Hx wisdom teeth extraction       Social History     Occupational History    Not on file.      Social History Main Topics    Smoking status: Never Smoker    Smokeless tobacco: Never Used    Alcohol use No    Drug use: No    Sexual activity: Yes     Partners: Male     Birth control/ protection: None      Family History   Problem Relation Age of Onset    Arthritis-osteo Mother     Other Mother      Fibromyalgia    Elevated Lipids Father     Hypertension Father     Cancer Father PROSTATE    Arthritis-osteo Maternal Aunt     Asthma Maternal Grandmother     Arthritis-osteo Maternal Grandmother     Cancer Maternal Grandmother      MELANOMA    Breast Cancer Paternal Grandmother     Hypertension Paternal Grandmother        Allergies   Allergen Reactions    Sulfa (Sulfonamide Antibiotics) Other (comments) and Anaphylaxis     Immune system shuts down, S/S similiar to leukemia, states she will die if she has it again    Sulfamethoxazole-Trimethoprim Anaphylaxis     Other reaction(s): Hospitalized anaphylaxis                                      Prior to Admission medications    Medication Sig Start Date End Date Taking? Authorizing Provider   omeprazole (PRILOSEC) 10 mg capsule Take 10 mg by mouth daily. Yes Historical Provider   prenatal vit-iron fumarate-fa (PRENATAL S) 27-0.8 mg Tab tablet Take 1 Tab by mouth daily. Yes Historical Provider   cetirizine (ZYRTEC) 10 mg tablet Take  by mouth two (2) times daily as needed. Yes Historical Provider   albuterol (PROAIR HFA) 90 mcg/actuation inhaler Take 2 Puffs by inhalation. 11/29/16 11/29/17  Historical Provider   budesonide (RHINOCORT AQUA) 32 mcg/actuation nasal spray 2 Sprays by Nasal route. 11/28/16 11/28/17  Historical Provider        Review of Systems:  A comprehensive review of systems was negative except for that written in the History of Present Illness. Objective:     Vitals: There were no vitals filed for this visit. No data recorded. BP  Min: 150/92  Max: 172/98     Physical Exam:  Patient without distress. Heart: Regular rate and rhythm or S1S2 present  Lung: clear to auscultation throughout lung fields, no wheezes, no rales, no rhonchi and normal respiratory effort  Abdomen: soft, nontender  Lower Extremities:  - Edema 1+     dtr wnl.   Membranes:  Intact  fht obtained in office      Lab/Data Review:  Recent Results (from the past 24 hour(s))   AMB POC OB URINE DIP    Collection Time: 02/21/17  1:33 PM   Result Value Ref Range    Glucose (UA POC) Negative Negative    Protein (UA POC) Trace Negative mg/dL       Assessment and Plan:     CHTN versus preeclampsia. Will admit  - serial blood pressure, hellp labs, 24 hour, MFM consult for us / dopplers.       Signed By:  Claudy Shah DO     February 21, 2017

## 2017-02-21 NOTE — IP AVS SNAPSHOT
Lara Nguyen 
 
 
 [id number] [address] 
[phone] Patient: Maddison Mcelroy MRN: [id number] :1980 You are allergic to the following Allergen Reactions Sulfa (Sulfonamide Antibiotics) Other (comments) Anaphylaxis Immune system shuts down, S/S similiar to leukemia, states she will die if she has it again  
    
 Sulfamethoxazole-Trimethoprim Anaphylaxis Other reaction(s): Hospitalized anaphylaxis Recent Documentation OB Status Pregnant Emergency Contacts Name Discharge Info Relation Home Work Mobile [address] 52 About your hospitalization You were admitted on:  2017 You last received care in the:  [facility] ANTEPARTUM You were discharged on:  2017 Unit phone number:  [phone] Why you were hospitalized Your primary diagnosis was:  Not on File Providers Seen During Your Hospitalizations Provider Role Specialty Primary office phone Cathy Street MD Attending Provider Obstetrics & Gynecology [phone] Your Primary Care Physician (PCP) Primary Care Physician Office Phone Office Fax Romeo Hilario [phone] Follow-up Information Follow up With Details Comments Contact Info Reji Robin On 2017 for a BPP [address] [address] 
[phone] Your Appointments 2017 10:00 AM EDT  
 with [facility] OR  
[facility] 4 L&D PROCEDURE ([facility] [address] [place] [address]) [address] [facility] [address]  
[phone] 2017  SECTION with Jemma Weems MD  
[facility] 4 LABOR & DELIVERY (--)  
 [address]  
[phone] Current Discharge Medication List  
  
CONTINUE these medications which have NOT CHANGED Dose & Instructions Dispensing Information Comments Morning Noon Evening Bedtime  
 budesonide 32 mcg/actuation nasal spray Commonly known as:  RHINOCORT AQUA Your next dose is: Today, Tomorrow Other:  _________ Dose:  2 Spray 2 Sprays by Nasal route. Refills:  0 PRENATAL S 27-0.8 mg Tab tablet Generic drug:  prenatal vit-iron fumarate-fa Your next dose is: Today, Tomorrow Other:  _________ Dose:  1 Tab Take 1 Tab by mouth daily. Refills:  0 PriLOSEC 10 mg capsule Generic drug:  omeprazole Your next dose is: Today, Tomorrow Other:  _________ Dose:  10 mg Take 10 mg by mouth daily. Refills:  0 PROAIR HFA 90 mcg/actuation inhaler Generic drug:  albuterol Your next dose is: Today, Tomorrow Other:  _________ Dose:  2 Puff Take 2 Puffs by inhalation. Refills:  0 ZyrTEC 10 mg tablet Generic drug:  cetirizine Your next dose is: Today, Tomorrow Other:  _________ Take  by mouth two (2) times daily as needed. Refills:  0 Discharge Instructions Pregnancy Precautions: Care Instructions Your Care Instructions There is no sure way to prevent labor before your due date ( labor) or to prevent most other pregnancy problems. But there are things you can do to increase your chances of a healthy pregnancy. Go to your appointments, follow your doctor's advice, and take good care of yourself. Eat well, and exercise (if your doctor agrees). And make sure to drink plenty of water. Follow-up care is a key part of your treatment and safety.  Be sure to make and go to all appointments, and call your doctor if you are having problems. It's also a good idea to know your test results and keep a list of the medicines you take. How can you care for yourself at home? · Make sure you go to your prenatal appointments. At each visit, your doctor will check your blood pressure. Your doctor will also check to see if you have protein in your urine. High blood pressure and protein in urine are signs of preeclampsia. This condition can be dangerous for you and your baby. · Drink plenty of fluids, enough so that your urine is light yellow or clear like water. Dehydration can cause contractions. If you have kidney, heart, or liver disease and have to limit fluids, talk with your doctor before you increase the amount of fluids you drink. · Tell your doctor right away if you notice any symptoms of an infection, such as: ¨ Burning when you urinate. ¨ A foul-smelling discharge from your vagina. ¨ Vaginal itching. ¨ Unexplained fever. ¨ Unusual pain or soreness in your uterus or lower belly. · Eat a balanced diet. Include plenty of foods that are high in calcium and iron. ¨ Foods high in calcium include milk, cheese, yogurt, almonds, and broccoli. ¨ Foods high in iron include red meat, shellfish, poultry, eggs, beans, raisins, whole-grain bread, and leafy green vegetables. · Do not smoke. If you need help quitting, talk to your doctor about stop-smoking programs and medicines. These can increase your chances of quitting for good. · Do not drink alcohol or use illegal drugs. · Follow your doctor's directions about activity. Your doctor will let you know how much, if any, exercise you can do. · Ask your doctor if you can have sex. If you are at risk for early labor, your doctor may ask you to not have sex. · Take care to prevent falls. During pregnancy, your joints are loose, and your balance is off. Sports such as bicycling, skiing, or in-line skating can increase your risk of falling.  And don't ride horses or motorcycles, dive, water ski, scuba dive, or parachute jump while you are pregnant. · Avoid getting very hot. Do not use saunas or hot tubs. Avoid staying out in the sun in hot weather for long periods. Take acetaminophen (Tylenol) to lower a high fever. · Do not take any over-the-counter or herbal medicines or supplements without talking to your doctor or pharmacist first. 
When should you call for help? Call 911 anytime you think you may need emergency care. For example, call if: 
· You passed out (lost consciousness). · You have severe vaginal bleeding. · You have severe pain in your belly or pelvis. · You have had fluid gushing or leaking from your vagina and you know or think the umbilical cord is bulging into your vagina. If this happens, immediately get down on your knees so your rear end (buttocks) is higher than your head. This will decrease the pressure on the cord until help arrives. Call your doctor now or seek immediate medical care if: 
· You have signs of preeclampsia, such as: 
¨ Sudden swelling of your face, hands, or feet. ¨ New vision problems (such as dimness or blurring). ¨ A severe headache. · You have any vaginal bleeding. · You have belly pain or cramping. · You have a fever. · You have had regular contractions (with or without pain) for an hour. This means that you have 8 or more within 1 hour or 4 or more in 20 minutes after you change your position and drink fluids. · You have a sudden release of fluid from your vagina. · You have low back pain or pelvic pressure that does not go away. · You notice that your baby has stopped moving or is moving much less than normal. 
Watch closely for changes in your health, and be sure to contact your doctor if you have any problems. Where can you learn more? Go to http://orlin-vitaliy.info/. Enter [id number] in the search box to learn more about \"Pregnancy Precautions: Care Instructions. \" Current as of: May 30, 2016 Content Version: 11.1 © 2693-3384 [employer]. Care instructions adapted under license by [employer] (which disclaims liability or warranty for this information). If you have questions about a medical condition or this instruction, always ask your healthcare professional. [address] any warranty or liability for your use of this information. Discharge Orders None Qapa Announcement We are excited to announce that we are making your provider's discharge notes available to you in Qapa. You will see these notes when they are completed and signed by the physician that discharged you from your recent hospital stay. If you have any questions or concerns about any information you see in Qapa, please call the Health Information Department where you were seen or reach out to your Primary Care Provider for more information about your plan of care. Introducing [facility]! Igor Mariano introduces Qapa patient portal. Now you can access parts of your medical record, email your doctor's office, and request medication refills online. 1. In your internet browser, go to [email or web address] 2. Click on the First Time User? Click Here link in the Sign In box. You will see the New Member Sign Up page. 3. Enter your Qapa Access Code exactly as it appears below. You will not need to use this code after youve completed the sign-up process. If you do not sign up before the expiration date, you must request a new code. · Qapa Access Code: [email or web address] Expires: 4/3/2017  9:12 AM 
 
4. Enter the last four digits of your Social Security Number (xxxx) and Date of Birth (mm/dd/yyyy) as indicated and click Submit. You will be taken to the next sign-up page. 5. Create a Qapa ID. This will be your Qapa login ID and cannot be changed, so think of one that is secure and easy to remember. 6. Create a AutoNavi password. You can change your password at any time. 7. Enter your Password Reset Question and Answer. This can be used at a later time if you forget your password. 8. Enter your e-mail address. You will receive e-mail notification when new information is available in [address] 9. Click Sign Up. You can now view and download portions of your medical record. 10. Click the Download Summary menu link to download a portable copy of your medical information. If you have questions, please visit the Frequently Asked Questions section of the AutoNavi website. Remember, AutoNavi is NOT to be used for urgent needs. For medical emergencies, dial 911. Now available from your iPhone and Android! General Information Please provide this summary of care documentation to your next provider. Patient Signature:  ____________________________________________________________ Date:  ____________________________________________________________  
  
Samy Artist Provider Signature:  ____________________________________________________________ Date:  ____________________________________________________________

## 2017-02-22 VITALS
RESPIRATION RATE: 18 BRPM | SYSTOLIC BLOOD PRESSURE: 133 MMHG | DIASTOLIC BLOOD PRESSURE: 81 MMHG | TEMPERATURE: 98.1 F | HEART RATE: 83 BPM

## 2017-02-22 LAB
COLLECT DURATION TIME UR: 24 HR
PROT 24H UR-MRATE: 237 MG/24HR
PROT UR-MCNC: 12 MG/DL
SPECIMEN VOL ?TM UR: 1975 ML

## 2017-02-22 PROCEDURE — G0378 HOSPITAL OBSERVATION PER HR: HCPCS

## 2017-02-22 PROCEDURE — 59025 FETAL NON-STRESS TEST: CPT | Performed by: OBSTETRICS & GYNECOLOGY

## 2017-02-22 PROCEDURE — 99218 HC RM OBSERVATION: CPT

## 2017-02-22 PROCEDURE — 74011250637 HC RX REV CODE- 250/637: Performed by: OBSTETRICS & GYNECOLOGY

## 2017-02-22 PROCEDURE — 99245 OFF/OP CONSLTJ NEW/EST HI 55: CPT | Performed by: OBSTETRICS & GYNECOLOGY

## 2017-02-22 RX ORDER — ACETAMINOPHEN 500 MG
1000 TABLET ORAL ONCE
Status: COMPLETED | OUTPATIENT
Start: 2017-02-22 | End: 2017-02-22

## 2017-02-22 RX ADMIN — ACETAMINOPHEN 1000 MG: 500 TABLET, FILM COATED ORAL at 15:45

## 2017-02-22 NOTE — PROGRESS NOTES
02/21/17 2155   Fetal Vital Signs   Mode External   Fetal Heart Rate 135   Fetal Activity Present   Variability 6-25 BPM   Decelerations None   Accelerations Yes   RN Reviewed Strip?  Yes   Non Stress Test Reactive   Uterine Activity   Mode External   Frequency (min) x1   Duration (sec) 50   reactive strip obtained, EFM removed

## 2017-02-22 NOTE — DISCHARGE SUMMARY
[address] OB Discharge Summary     Patient ID:  Jaylen Conti  [id number]  23 y.o.  1980    Admit date: 2017    Discharge date and time: No discharge date for patient encounter. Admitting Physician: Mita Jacobs MD     Discharge Physician: Bobby Mosley M.D. Admission Diagnoses: HTN in office 34 weeks IUP    Problem List:  Doctors , undelivered   ; Other -   Patient Active Problem List   Diagnosis Code    Asthma J45.909    Sciatica M54.30    Supervision of elderly multigravida (>=28years old at time of delivery) O09.529    Chronic hypertension I10    History of 2  sections Z87.59        Discharge Diagnoses: Observation of HTN in pregnancy, ruled out HELLP    Hospital Course: Jaylen Conti had unremarkeable progressive recovery. , Eating, ambulating, and voiding in a routine manner.  and Hospital course complicated by HTN    Significant Diagnostic Studies:   Recent Results (from the past 24 hour(s))   AMB POC OB URINE DIP    Collection Time: 17  1:33 PM   Result Value Ref Range    Glucose (UA POC) Negative Negative    Protein (UA POC) Trace Negative mg/dL   CBC W/O DIFF    Collection Time: 17  5:25 PM   Result Value Ref Range    WBC 12.0 (H) 4.3 - 11.1 K/uL    RBC 3.83 (L) 4.05 - 5.25 M/uL    HGB 10.5 (L) 11.7 - 15.4 g/dL    HCT 33.6 (L) 35.8 - 46.3 %    MCV 87.7 79.6 - 97.8 FL    MCH 27.4 26.1 - 32.9 PG    MCHC 31.3 (L) 31.4 - 35.0 g/dL    RDW 13.8 11.9 - 14.6 %    PLATELET 176 095 - 011 K/uL    MPV 10.0 (L) 10.8 - 83.0 FL   METABOLIC PANEL, COMPREHENSIVE    Collection Time: 17  5:25 PM   Result Value Ref Range    Sodium 138 136 - 145 mmol/L    Potassium 4.1 3.5 - 5.1 mmol/L    Chloride 102 98 - 107 mmol/L    CO2 25 21 - 32 mmol/L    Anion gap 11 7 - 16 mmol/L    Glucose 74 65 - 100 mg/dL    BUN 13 6 - 23 MG/DL    Creatinine 0.65 0.6 - 1.0 MG/DL    GFR est AA >60 >60 ml/min/1.73m2    GFR est non-AA >60 >60 ml/min/1.73m2    Calcium 8.6 8.3 - 10.4 MG/DL Bilirubin, total 0.2 0.2 - 1.1 MG/DL    ALT (SGPT) 14 12 - 65 U/L    AST (SGOT) 15 15 - 37 U/L    Alk. phosphatase 104 50 - 136 U/L    Protein, total 6.6 6.3 - 8.2 g/dL    Albumin 2.7 (L) 3.5 - 5.0 g/dL    Globulin 3.9 (H) 2.3 - 3.5 g/dL    A-G Ratio 0.7 (L) 1.2 - 3.5     LD    Collection Time: 02/21/17  5:25 PM   Result Value Ref Range     100 - 190 U/L   URIC ACID    Collection Time: 02/21/17  5:25 PM   Result Value Ref Range    Uric acid 3.5 2.6 - 6.0 MG/DL       Procedures: U/S and NST    Discharge Exam:  Visit Vitals    /79 (BP Patient Position: Sitting)    Pulse 90    Temp 98.1 °F (36.7 °C)    Resp 18    LMP 06/27/2016        Heart: regular rate and rhythm, S1, S2 normal, no murmur, click, rub or gallop  Lungs:clear to auscultation bilaterally  Extremities: normal, atraumatic, no cyanosis or edema. No DVT  BPP 6/8 with reactive NST    Patient Instructions:   Current Discharge Medication List      CONTINUE these medications which have NOT CHANGED    Details   omeprazole (PRILOSEC) 10 mg capsule Take 10 mg by mouth daily. prenatal vit-iron fumarate-fa (PRENATAL S) 27-0.8 mg Tab tablet Take 1 Tab by mouth daily. cetirizine (ZYRTEC) 10 mg tablet Take  by mouth two (2) times daily as needed. albuterol (PROAIR HFA) 90 mcg/actuation inhaler Take 2 Puffs by inhalation. budesonide (RHINOCORT AQUA) 32 mcg/actuation nasal spray 2 Sprays by Nasal route.             Activity: physical activity is restricted per discharge instructions  Diet: resume normal diet  Wound Care: Keep wound clean and dry, as directed    Follow-up with Bradley Emanuel MD in 2 days    Signed:  Bradley Emanuel MD  2/22/2017  1:19 PM

## 2017-02-22 NOTE — PROGRESS NOTES
Discharge instructions given to the pt and , both verbalized understanding and denies needs or questions at this time. Pt ambulatory to private vehicle, stable at discharge, undelivered.

## 2017-02-22 NOTE — DISCHARGE INSTRUCTIONS
Pregnancy Precautions: Care Instructions  Your Care Instructions  There is no sure way to prevent labor before your due date ( labor) or to prevent most other pregnancy problems. But there are things you can do to increase your chances of a healthy pregnancy. Go to your appointments, follow your doctor's advice, and take good care of yourself. Eat well, and exercise (if your doctor agrees). And make sure to drink plenty of water. Follow-up care is a key part of your treatment and safety. Be sure to make and go to all appointments, and call your doctor if you are having problems. It's also a good idea to know your test results and keep a list of the medicines you take. How can you care for yourself at home? · Make sure you go to your prenatal appointments. At each visit, your doctor will check your blood pressure. Your doctor will also check to see if you have protein in your urine. High blood pressure and protein in urine are signs of preeclampsia. This condition can be dangerous for you and your baby. · Drink plenty of fluids, enough so that your urine is light yellow or clear like water. Dehydration can cause contractions. If you have kidney, heart, or liver disease and have to limit fluids, talk with your doctor before you increase the amount of fluids you drink. · Tell your doctor right away if you notice any symptoms of an infection, such as:  ¨ Burning when you urinate. ¨ A foul-smelling discharge from your vagina. ¨ Vaginal itching. ¨ Unexplained fever. ¨ Unusual pain or soreness in your uterus or lower belly. · Eat a balanced diet. Include plenty of foods that are high in calcium and iron. ¨ Foods high in calcium include milk, cheese, yogurt, almonds, and broccoli. ¨ Foods high in iron include red meat, shellfish, poultry, eggs, beans, raisins, whole-grain bread, and leafy green vegetables. · Do not smoke.  If you need help quitting, talk to your doctor about stop-smoking programs and medicines. These can increase your chances of quitting for good. · Do not drink alcohol or use illegal drugs. · Follow your doctor's directions about activity. Your doctor will let you know how much, if any, exercise you can do. · Ask your doctor if you can have sex. If you are at risk for early labor, your doctor may ask you to not have sex. · Take care to prevent falls. During pregnancy, your joints are loose, and your balance is off. Sports such as bicycling, skiing, or in-line skating can increase your risk of falling. And don't ride horses or motorcycles, dive, water ski, scuba dive, or parachute jump while you are pregnant. · Avoid getting very hot. Do not use saunas or hot tubs. Avoid staying out in the sun in hot weather for long periods. Take acetaminophen (Tylenol) to lower a high fever. · Do not take any over-the-counter or herbal medicines or supplements without talking to your doctor or pharmacist first.  When should you call for help? Call 911 anytime you think you may need emergency care. For example, call if:  · You passed out (lost consciousness). · You have severe vaginal bleeding. · You have severe pain in your belly or pelvis. · You have had fluid gushing or leaking from your vagina and you know or think the umbilical cord is bulging into your vagina. If this happens, immediately get down on your knees so your rear end (buttocks) is higher than your head. This will decrease the pressure on the cord until help arrives. Call your doctor now or seek immediate medical care if:  · You have signs of preeclampsia, such as:  ¨ Sudden swelling of your face, hands, or feet. ¨ New vision problems (such as dimness or blurring). ¨ A severe headache. · You have any vaginal bleeding. · You have belly pain or cramping. · You have a fever. · You have had regular contractions (with or without pain) for an hour.  This means that you have 8 or more within 1 hour or 4 or more in 20 minutes after you change your position and drink fluids. · You have a sudden release of fluid from your vagina. · You have low back pain or pelvic pressure that does not go away. · You notice that your baby has stopped moving or is moving much less than normal.  Watch closely for changes in your health, and be sure to contact your doctor if you have any problems. Where can you learn more? Go to http://orlin-vitaliy.info/. Enter [id number] in the search box to learn more about \"Pregnancy Precautions: Care Instructions. \"  Current as of: May 30, 2016  Content Version: 11.1  © 5685-2480 [employer]. Care instructions adapted under license by [employer] (which disclaims liability or warranty for this information). If you have questions about a medical condition or this instruction, always ask your healthcare professional. [address] any warranty or liability for your use of this information.

## 2017-02-22 NOTE — CONSULTS
Maternal Fetal Medicine Consult Note      Requesting MD-    Chief Complaint:  Pregnancy and elevated BP  in primary OB office. History of Present Illness:Lilian Feng is a 29 y.o.   with an estimated gestational age of 35w2d with Estimated Date of Delivery: 4/3/17. Pregnancy has been complicated by chronic hypertension versus early gestational HTN; AMA. First 24hr urine too low to calculate in 2016; subsequent increase to 128 on . Serum labs within normal limits   Blood pressure history is unclear- states in the past was on anti-hypertensive medication, but none this pregnancy. Has had sporadic mild range BP readings since early second trimester, none consistent. AMA- declined genetic screening, had normal anatomy ultrasound at [facility]. Yesterday, patient noted to have a severe range BP that improved to mild range on recheck. Sent to [facility] for preeclampsia evaluation. Since admission normal to mild range BP, normal serum studies for HELLP, and no symptoms of severe disease. Patient denies severe HA, RUQ pain, vision changes, or other concerns. Has a mild headache which she feels is secondary to caffeine intake- will let us know if it doesn't improve. Fetus has been active without any recent decrease in movement activity.     OB History    Para Term  AB SAB TAB Ectopic Multiple Living   3 2 2 0 0 0 0 0 0 2      # Outcome Date GA Lbr Honorio/2nd Weight Sex Delivery Anes PTL Lv   3 Current            2 Term 10/10/11 39w1d  8 lb 3.2 oz (3.72 kg) F  LO SPINAL AN N Y   1 Term 08   7 lb 2 oz (3.232 kg) F    Y           Past Surgical History:   Procedure Laterality Date     DELIVERY ONLY  04/03/2008    x2    HX OTHER SURGICAL      Bone Marrow BX    HX WISDOM TEETH EXTRACTION         Past Medical History:   Diagnosis Date    Anxiety     Asthma     Chronic hypertension 2016    Collar bone fracture     Depression     Migraines     OTC meds only    PIH (pregnancy induced hypertension)     Was taking Propranolol    Sciatica 2013    Supervision of elderly multigravida (>=28years old at time of delivery) 2016    Supervision of high-risk pregnancy 2016    Varicella        Family History   Problem Relation Age of Onset    Arthritis-osteo Mother     Other Mother      Fibromyalgia    Elevated Lipids Father     Hypertension Father     Cancer Father      PROSTATE    Arthritis-osteo Maternal Aunt     Asthma Maternal Grandmother     Arthritis-osteo Maternal Grandmother     Cancer Maternal Grandmother      MELANOMA    Breast Cancer Paternal Grandmother     Hypertension Paternal Grandmother        Allergies   Allergen Reactions    Sulfa (Sulfonamide Antibiotics) Other (comments) and Anaphylaxis     Immune system shuts down, S/S similiar to leukemia, states she will die if she has it again    Sulfamethoxazole-Trimethoprim Anaphylaxis     Other reaction(s): Hospitalized anaphylaxis                                        No current facility-administered medications for this encounter. Social History     Social History    Marital status:      Spouse name: N/A    Number of children: N/A    Years of education: N/A     Occupational History    Not on file. Social History Main Topics    Smoking status: Never Smoker    Smokeless tobacco: Never Used    Alcohol use No    Drug use: No    Sexual activity: Yes     Partners: Male     Birth control/ protection: None     Other Topics Concern    Not on file     Social History Narrative       Review of Systems  A comprehensive review of systems was negative except for that written in the HPI.      Vitals:    Patient Vitals for the past 24 hrs:   BP   17 1350 133/81   17 0903 136/79   17 0619 116/64   17 0211 145/79   17 2128 137/65   17 1643 146/75     Temp (24hrs), Av.5 °F (36.9 °C), Min:98 °F (36.7 °C), Max:99.4 °F (37.4 °C)    I&O:               02/20 1901 - 02/22 0700  In: 939 [P.O.:832]  Out: -     Exam:  Patient without distress.                Abdomen: soft, non-tender               Fundus: soft and non tender               Fundal Height: 35 cm               Right Upper Quadrant: non-tender               Lower Extremity Edema: 1+               Patellar Reflexes: 1+ bilaterally               Clonus: absent    Cervical Exam:     deferred                                Uterine Activity: Frequency (min): 0                                   Membranes: Membrane Status: Intact                              Fetal Heart Rate: Mode: ExternalFetal Heart Rate: 125    Reactive NST      Labs:   CBC:    Recent Labs      02/21/17 1725 01/31/17   1040   WBC  12.0*  11.8*   HGB  10.5*  10.6*   HCT  33.6*  32.2*   PLT  180  203       CMP:   Recent Labs      02/21/17   1725  01/31/17   1040  11/02/16   1311   NA  138   --    --    K  4.1   --    --    CL  102   --    --    CO2  25   --    --    AGAP  11   --    --    GLU  74   --    --    BUN  13   --    --    CREA  0.65   --   0.58   GFRAA  >60   --   138   GFRNA  >60   --   120   CA  8.6   --    --    ALB  2.7*   --    --    TP  6.6   --    --    GLOB  3.9*   --    --    AGRAT  0.7*   --    --    SGOT  15  16   --    ALT  14  10   --        Recent Labs      02/21/17 1725 01/31/17   1040   URICA  3.5  3.8   LDH  142   --        Recent Glucose Results: Recent Glucose Results:   Recent Labs      02/21/17   1725   GLU  74       Prenatal Labs:    Lab Results   Component Value Date/Time    Rubella, External immune 03/11/2011    GrBStrep, External negative 09/14/2011    HBsAg, External NR 03/11/2011    HIV, External NR 03/11/2011    RPR, External NR 03/11/2011    Gonorrhea, External neg 09/29/2016    Chlamydia, External neg 09/29/2016         Assessment and Plan:    Patient Active Problem List    Diagnosis    Supervision of elderly multigravida (>=28years old at time of delivery)    Chronic hypertension     24 hour urine baseline 16 24 hour=<60.0  Add vit d and asa at 16 weeks      History of 2  sections    Asthma    Sciatica       Hypertensive Disorder of Pregnancy:  Probable chronic hypertension, possible new superimposed preeclampsia      Diagnosis  Diagnosis of preeclampsia depends on development of elevated BP (SBP>140, DBP >90) beyond 20 weeks gestation or worsening of preexisting blood pressure. Severe blood pressures, SBP>160 or DBP>110 or higher on two occasions 4 hours apart, on bedrest (unless antihypertensive therapy is initiated before this time) are a criteria of severe disease. Proteinuria (>300mg/24hr or protein:creatine ratio >0.3) may or may not be present. The presence or absence of proteinuria is not predictive of maternal or fetal outcome. If proteinuria is absent, diagnosis requires at least one of the following features of severe disease:  ·  thrombocytopenia (platelet count <290Y)  · impaired liver function (transaminaases increased greater than 2x normal)  · new renal insufficiency (Cr >1.1, or doubling in absence of other renal disease)  · pulmonary edema  · new onset of cerebral/visual disturbances. Anti-hypertensive Therapy  · No therapy is recommended for new onset HTN for blood pressures consistently in mild range (SBP<160, DBP<110). · Use of anti-hypertensive therapy is recommended if SBP >160, DBP >110. · BP goals in those with Chronic HTN: -160, DBP . Antepartum monitoring  · Daily assessment of maternal symptoms and fetal movement  · At least twice weekly measures of BP- if out patient care with preeclampsia, would recommend home BP monitoring  · Weekly assessment of platelet count and LFTs. · In this patient with probable chronic HTN, needs twice weekly NST/BPP. · Compliant patients without features of severe disease may qualify for outpatient management on a case by case basis.  If would like home BP monitoring, please consult social work to aid in setting this up. PREECLAMPSIA without SEVERE FEATURES- Attempt to manage expectantly until 37 0/7 weeks, unless features of severe preeclampsia occurs or fetal status becomes non reassuring  · In those with mild range BP (<160 SBP, <110 DBP) and without maternal symptoms, magnesium sulfate is not necessary. · Consider late   steroids if <37 weeks. If worsens to PREECLAMPSIA with SEVERE FEATURES, including HELLP syndrome:  · Deliver at diagnosis; with option to initiate late   steroids- however delivery should NOT be delayed to complete course. · In those with severe features or eclampsia, magnesium sulfate is recommended. 4-6 gram bolus, then maintenance of 2 grams per hour. · Get serum Mg level 4 hours after bolus, then every 6 hours. Mode of delivery based on obstetric indications, rather than diagnosis of hypertensive disorder in pregnancy. POSTPARTUM CARE  · NSAID use should be avoided postpartum in women with hypertension persisting for more than 1 days after delivery. · BP monitoring as an inpatient for at least 72 hours postpartum, and again 7-10 days after delivery. · If persistent HTN postpartum (SBP>150, DBP>100), recommend initiation of antihypertensive therapy. · Fluid shifts should be closely monitored in the postpartum setting, with strict I&Os every shift and daily weights. If diuresis has not occurred within 48 hours of delivery, patient is at elevated risk of pulmonary edema. Recommendations based on ACOG Committee Opinion #560, 2013 and \"Hypertension in Zero Skyla" developed by the [employer] on HTN in Pregnancy for ACOG, 2013.        Plan:   Strict I&Os  daily weights  Assessment for proteinuria pending- 24hr urine done at 1610  Daily monitoring with NSTs while inpatient   Deliver for worsening maternal or fetal status    Discussed with patient- if chronic HTN without proteinuria- dc home this pm. Twice weekly testing. Delivery at 38-39 weeks. If superimposed preE without severe features- remain until am 2, set up home BP monitoring if possible. Delivery at 37 weeks. If severe features develop- delivery without waiting for full  steroid course. Needs twice weekly testing for remainder of pregnancy. Growth today appropriate at 50% EFW and AC. Normal STACIA. Normal S/D. In future pregnancies, needs to initiate 81mg ASA and vit D by 14-16 weeks. Signed By:  Jesus Cain MD     2017             Time:110   Minutes spent on floor,with greater than 50% of the time examining patient, explaining plan and coordinating care with nurse and requesting primary physician.

## 2017-03-16 NOTE — PROGRESS NOTES
Patient ID verified. Allergies, medical history, prenatal record and prior to admission medications verified. Pt instructed to be NPO after midnight. Pt instructed to arrive at hospital @0945,come to entrance C and sign in at the registration desk on the 4th floor. Patient instructed to come to hospital sooner if SROM, labor, or concerning symptoms. Patient verbalized understanding. Questions encouraged and answered. Patient's prenatal record and scheduled delivery form have been scanned into [employer]. Results console and orders have been placed in [facility].

## 2017-03-19 ENCOUNTER — ANESTHESIA EVENT (OUTPATIENT)
Dept: LABOR AND DELIVERY | Age: 37
End: 2017-03-19
Payer: COMMERCIAL

## 2017-03-20 ENCOUNTER — SURGERY (OUTPATIENT)
Age: 37
End: 2017-03-20

## 2017-03-20 ENCOUNTER — ANESTHESIA (OUTPATIENT)
Dept: LABOR AND DELIVERY | Age: 37
End: 2017-03-20
Payer: COMMERCIAL

## 2017-03-20 ENCOUNTER — HOSPITAL ENCOUNTER (INPATIENT)
Age: 37
LOS: 3 days | Discharge: HOME | End: 2017-03-23
Attending: OBSTETRICS & GYNECOLOGY | Admitting: OBSTETRICS & GYNECOLOGY
Payer: COMMERCIAL

## 2017-03-20 DIAGNOSIS — Z98.891 HISTORY OF 2 CESAREAN SECTIONS: ICD-10-CM

## 2017-03-20 DIAGNOSIS — Z98.891 H/O CESAREAN SECTION: ICD-10-CM

## 2017-03-20 DIAGNOSIS — O09.523 SUPERVISION OF ELDERLY MULTIGRAVIDA (>=35 YEARS OLD AT TIME OF DELIVERY), THIRD TRIMESTER: Primary | ICD-10-CM

## 2017-03-20 DIAGNOSIS — I10 CHRONIC HYPERTENSION: ICD-10-CM

## 2017-03-20 DIAGNOSIS — J45.20 MILD INTERMITTENT ASTHMA WITHOUT COMPLICATION: ICD-10-CM

## 2017-03-20 DIAGNOSIS — M54.30 SCIATICA, UNSPECIFIED LATERALITY: ICD-10-CM

## 2017-03-20 LAB
ABO + RH BLD: NORMAL
BASE EXCESS BLDCOA CALC-SCNC: 1.2 MMOL/L (ref 0–3)
BASE EXCESS BLDCOV CALC-SCNC: 0.7 MMOL/L (ref 1.9–7.7)
BDY SITE: ABNORMAL
BDY SITE: ABNORMAL
BLOOD GROUP ANTIBODIES SERPL: NORMAL
ERYTHROCYTE [DISTWIDTH] IN BLOOD BY AUTOMATED COUNT: 14.4 % (ref 11.9–14.6)
HCO3 BLDCOA-SCNC: 26 MMOL/L (ref 22–26)
HCO3 BLDV-SCNC: 25 MMOL/L
HCT VFR BLD AUTO: 31.7 % (ref 35.8–46.3)
HGB BLD-MCNC: 10 G/DL (ref 11.7–15.4)
MCH RBC QN AUTO: 26.6 PG (ref 26.1–32.9)
MCHC RBC AUTO-ENTMCNC: 31.5 G/DL (ref 31.4–35)
MCV RBC AUTO: 84.3 FL (ref 79.6–97.8)
PCO2 BLDCOA: 43 MMHG (ref 33–49)
PCO2 BLDCOV: 38 MMHG (ref 14.1–43.3)
PH BLDCOA: 7.4 [PH] (ref 7.21–7.31)
PH BLDCOV: 7.43 [PH] (ref 7.2–7.44)
PLATELET # BLD AUTO: 180 K/UL (ref 150–450)
PMV BLD AUTO: 10 FL (ref 10.8–14.1)
PO2 BLDCOA: 25 MMHG (ref 9–19)
PO2 BLDV: 34 MMHG (ref 30.4–57.2)
RBC # BLD AUTO: 3.76 M/UL (ref 4.05–5.25)
SERVICE CMNT-IMP: ABNORMAL
SERVICE CMNT-IMP: ABNORMAL
SPECIMEN EXP DATE BLD: NORMAL
WBC # BLD AUTO: 9.8 K/UL (ref 4.3–11.1)

## 2017-03-20 PROCEDURE — 77030032490 HC SLV COMPR SCD KNE COVD -B: Performed by: OBSTETRICS & GYNECOLOGY

## 2017-03-20 PROCEDURE — 77030002933 HC SUT MCRYL J&J -A: Performed by: OBSTETRICS & GYNECOLOGY

## 2017-03-20 PROCEDURE — 4A1HXCZ MONITORING OF PRODUCTS OF CONCEPTION, CARDIAC RATE, EXTERNAL APPROACH: ICD-10-PCS | Performed by: OBSTETRICS & GYNECOLOGY

## 2017-03-20 PROCEDURE — 76010000392 HC C SECN EA ADDL 0.5 HR: Performed by: OBSTETRICS & GYNECOLOGY

## 2017-03-20 PROCEDURE — 74011250636 HC RX REV CODE- 250/636: Performed by: ANESTHESIOLOGY

## 2017-03-20 PROCEDURE — 77030002974 HC SUT PLN J&J -A: Performed by: OBSTETRICS & GYNECOLOGY

## 2017-03-20 PROCEDURE — 76060000078 HC EPIDURAL ANESTHESIA: Performed by: OBSTETRICS & GYNECOLOGY

## 2017-03-20 PROCEDURE — 77030005518 HC CATH URETH FOL 2W BARD -B

## 2017-03-20 PROCEDURE — 77030007880 HC KT SPN EPDRL BBMI -B: Performed by: NURSE ANESTHETIST, CERTIFIED REGISTERED

## 2017-03-20 PROCEDURE — 77030032490 HC SLV COMPR SCD KNE COVD -B

## 2017-03-20 PROCEDURE — 75410000003 HC RECOV DEL/VAG/CSECN EA 0.5 HR: Performed by: OBSTETRICS & GYNECOLOGY

## 2017-03-20 PROCEDURE — 82803 BLOOD GASES ANY COMBINATION: CPT

## 2017-03-20 PROCEDURE — 74011250636 HC RX REV CODE- 250/636

## 2017-03-20 PROCEDURE — 77030011640 HC PAD GRND REM COVD -A: Performed by: OBSTETRICS & GYNECOLOGY

## 2017-03-20 PROCEDURE — 76010000391 HC C SECN FIRST 1 HR: Performed by: OBSTETRICS & GYNECOLOGY

## 2017-03-20 PROCEDURE — 86900 BLOOD TYPING SEROLOGIC ABO: CPT | Performed by: OBSTETRICS & GYNECOLOGY

## 2017-03-20 PROCEDURE — 85027 COMPLETE CBC AUTOMATED: CPT | Performed by: OBSTETRICS & GYNECOLOGY

## 2017-03-20 PROCEDURE — 74011000250 HC RX REV CODE- 250

## 2017-03-20 PROCEDURE — 77030003665 HC NDL SPN BBMI -A: Performed by: NURSE ANESTHETIST, CERTIFIED REGISTERED

## 2017-03-20 PROCEDURE — 74011250637 HC RX REV CODE- 250/637: Performed by: ANESTHESIOLOGY

## 2017-03-20 PROCEDURE — 77030018836 HC SOL IRR NACL ICUM -A: Performed by: OBSTETRICS & GYNECOLOGY

## 2017-03-20 PROCEDURE — 77030005518 HC CATH URETH FOL 2W BARD -B: Performed by: OBSTETRICS & GYNECOLOGY

## 2017-03-20 PROCEDURE — 77030018846 HC SOL IRR STRL H20 ICUM -A: Performed by: OBSTETRICS & GYNECOLOGY

## 2017-03-20 PROCEDURE — 74011250637 HC RX REV CODE- 250/637: Performed by: OBSTETRICS & GYNECOLOGY

## 2017-03-20 PROCEDURE — 65270000029 HC RM PRIVATE

## 2017-03-20 PROCEDURE — 77030002888 HC SUT CHRMC J&J -A: Performed by: OBSTETRICS & GYNECOLOGY

## 2017-03-20 RX ORDER — ACETAMINOPHEN 500 MG
1000 TABLET ORAL EVERY 8 HOURS
Status: COMPLETED | OUTPATIENT
Start: 2017-03-20 | End: 2017-03-21

## 2017-03-20 RX ORDER — NALBUPHINE HYDROCHLORIDE 10 MG/ML
10 INJECTION, SOLUTION INTRAMUSCULAR; INTRAVENOUS; SUBCUTANEOUS
Status: DISCONTINUED | OUTPATIENT
Start: 2017-03-20 | End: 2017-03-21

## 2017-03-20 RX ORDER — ALBUTEROL SULFATE 90 UG/1
2 AEROSOL, METERED RESPIRATORY (INHALATION)
Status: DISCONTINUED | OUTPATIENT
Start: 2017-03-20 | End: 2017-03-23 | Stop reason: HOSPADM

## 2017-03-20 RX ORDER — KETOROLAC TROMETHAMINE 30 MG/ML
30 INJECTION, SOLUTION INTRAMUSCULAR; INTRAVENOUS
Status: DISCONTINUED | OUTPATIENT
Start: 2017-03-20 | End: 2017-03-21

## 2017-03-20 RX ORDER — TRISODIUM CITRATE DIHYDRATE AND CITRIC ACID MONOHYDRATE 500; 334 MG/5ML; MG/5ML
30 SOLUTION ORAL ONCE
Status: COMPLETED | OUTPATIENT
Start: 2017-03-20 | End: 2017-03-20

## 2017-03-20 RX ORDER — OXYCODONE HYDROCHLORIDE 5 MG/1
5 TABLET ORAL
Status: ACTIVE | OUTPATIENT
Start: 2017-03-20 | End: 2017-03-21

## 2017-03-20 RX ORDER — DEXTROSE, SODIUM CHLORIDE, SODIUM LACTATE, POTASSIUM CHLORIDE, AND CALCIUM CHLORIDE 5; .6; .31; .03; .02 G/100ML; G/100ML; G/100ML; G/100ML; G/100ML
125 INJECTION, SOLUTION INTRAVENOUS CONTINUOUS
Status: DISCONTINUED | OUTPATIENT
Start: 2017-03-20 | End: 2017-03-20 | Stop reason: HOSPADM

## 2017-03-20 RX ORDER — LABETALOL 100 MG/1
100 TABLET, FILM COATED ORAL 2 TIMES DAILY
Status: DISCONTINUED | OUTPATIENT
Start: 2017-03-20 | End: 2017-03-23 | Stop reason: HOSPADM

## 2017-03-20 RX ORDER — SODIUM CHLORIDE 0.9 % (FLUSH) 0.9 %
5-10 SYRINGE (ML) INJECTION EVERY 8 HOURS
Status: DISCONTINUED | OUTPATIENT
Start: 2017-03-20 | End: 2017-03-20 | Stop reason: HOSPADM

## 2017-03-20 RX ORDER — KETOROLAC TROMETHAMINE 30 MG/ML
INJECTION, SOLUTION INTRAMUSCULAR; INTRAVENOUS AS NEEDED
Status: DISCONTINUED | OUTPATIENT
Start: 2017-03-20 | End: 2017-03-20 | Stop reason: HOSPADM

## 2017-03-20 RX ORDER — SODIUM CHLORIDE, SODIUM LACTATE, POTASSIUM CHLORIDE, CALCIUM CHLORIDE 600; 310; 30; 20 MG/100ML; MG/100ML; MG/100ML; MG/100ML
INJECTION, SOLUTION INTRAVENOUS
Status: DISCONTINUED | OUTPATIENT
Start: 2017-03-20 | End: 2017-03-20 | Stop reason: HOSPADM

## 2017-03-20 RX ORDER — FAMOTIDINE 20 MG/1
20 TABLET, FILM COATED ORAL ONCE
Status: COMPLETED | OUTPATIENT
Start: 2017-03-20 | End: 2017-03-20

## 2017-03-20 RX ORDER — OXYTOCIN/RINGER'S LACTATE 30/500 ML
PLASTIC BAG, INJECTION (ML) INTRAVENOUS
Status: DISCONTINUED | OUTPATIENT
Start: 2017-03-20 | End: 2017-03-20 | Stop reason: HOSPADM

## 2017-03-20 RX ORDER — HYDROMORPHONE HYDROCHLORIDE 1 MG/ML
1 INJECTION, SOLUTION INTRAMUSCULAR; INTRAVENOUS; SUBCUTANEOUS
Status: ACTIVE | OUTPATIENT
Start: 2017-03-20 | End: 2017-03-21

## 2017-03-20 RX ORDER — OXYTOCIN/RINGER'S LACTATE 30/500 ML
250 PLASTIC BAG, INJECTION (ML) INTRAVENOUS ONCE
Status: DISCONTINUED | OUTPATIENT
Start: 2017-03-20 | End: 2017-03-20 | Stop reason: HOSPADM

## 2017-03-20 RX ORDER — DIPHENHYDRAMINE HYDROCHLORIDE 50 MG/ML
12.5 INJECTION, SOLUTION INTRAMUSCULAR; INTRAVENOUS
Status: DISPENSED | OUTPATIENT
Start: 2017-03-20 | End: 2017-03-21

## 2017-03-20 RX ORDER — ONDANSETRON 2 MG/ML
INJECTION INTRAMUSCULAR; INTRAVENOUS AS NEEDED
Status: DISCONTINUED | OUTPATIENT
Start: 2017-03-20 | End: 2017-03-20 | Stop reason: HOSPADM

## 2017-03-20 RX ORDER — SODIUM CHLORIDE 0.9 % (FLUSH) 0.9 %
5-10 SYRINGE (ML) INJECTION AS NEEDED
Status: DISCONTINUED | OUTPATIENT
Start: 2017-03-20 | End: 2017-03-20 | Stop reason: HOSPADM

## 2017-03-20 RX ORDER — CEFAZOLIN SODIUM IN 0.9 % NACL 2 G/50 ML
2 INTRAVENOUS SOLUTION, PIGGYBACK (ML) INTRAVENOUS ONCE
Status: DISCONTINUED | OUTPATIENT
Start: 2017-03-20 | End: 2017-03-20 | Stop reason: HOSPADM

## 2017-03-20 RX ORDER — SODIUM CHLORIDE, SODIUM LACTATE, POTASSIUM CHLORIDE, CALCIUM CHLORIDE 600; 310; 30; 20 MG/100ML; MG/100ML; MG/100ML; MG/100ML
125 INJECTION, SOLUTION INTRAVENOUS CONTINUOUS
Status: DISCONTINUED | OUTPATIENT
Start: 2017-03-20 | End: 2017-03-21

## 2017-03-20 RX ORDER — MORPHINE SULFATE 0.5 MG/ML
INJECTION, SOLUTION EPIDURAL; INTRATHECAL; INTRAVENOUS AS NEEDED
Status: DISCONTINUED | OUTPATIENT
Start: 2017-03-20 | End: 2017-03-20 | Stop reason: HOSPADM

## 2017-03-20 RX ORDER — BUPIVACAINE HYDROCHLORIDE 7.5 MG/ML
INJECTION, SOLUTION INTRASPINAL AS NEEDED
Status: DISCONTINUED | OUTPATIENT
Start: 2017-03-20 | End: 2017-03-20 | Stop reason: HOSPADM

## 2017-03-20 RX ORDER — CEFAZOLIN SODIUM IN 0.9 % NACL 2 G/100 ML
PLASTIC BAG, INJECTION (ML) INTRAVENOUS AS NEEDED
Status: DISCONTINUED | OUTPATIENT
Start: 2017-03-20 | End: 2017-03-20 | Stop reason: HOSPADM

## 2017-03-20 RX ORDER — METOCLOPRAMIDE 10 MG/1
10 TABLET ORAL ONCE
Status: COMPLETED | OUTPATIENT
Start: 2017-03-20 | End: 2017-03-20

## 2017-03-20 RX ADMIN — Medication 2 G: at 12:12

## 2017-03-20 RX ADMIN — SODIUM CHLORIDE, SODIUM LACTATE, POTASSIUM CHLORIDE, AND CALCIUM CHLORIDE 125 ML/HR: 600; 310; 30; 20 INJECTION, SOLUTION INTRAVENOUS at 16:30

## 2017-03-20 RX ADMIN — Medication 500 ML/HR: at 12:40

## 2017-03-20 RX ADMIN — FAMOTIDINE 20 MG: 20 TABLET ORAL at 11:34

## 2017-03-20 RX ADMIN — ACETAMINOPHEN 1000 MG: 500 TABLET, FILM COATED ORAL at 15:22

## 2017-03-20 RX ADMIN — ACETAMINOPHEN 1000 MG: 500 TABLET, FILM COATED ORAL at 22:55

## 2017-03-20 RX ADMIN — MORPHINE SULFATE 150 MCG: 0.5 INJECTION, SOLUTION EPIDURAL; INTRATHECAL; INTRAVENOUS at 12:22

## 2017-03-20 RX ADMIN — SODIUM CHLORIDE, SODIUM LACTATE, POTASSIUM CHLORIDE, AND CALCIUM CHLORIDE 1000 ML: 600; 310; 30; 20 INJECTION, SOLUTION INTRAVENOUS at 10:30

## 2017-03-20 RX ADMIN — METOCLOPRAMIDE 10 MG: 10 TABLET ORAL at 11:34

## 2017-03-20 RX ADMIN — LABETALOL HCL 100 MG: 100 TABLET, FILM COATED ORAL at 19:56

## 2017-03-20 RX ADMIN — SODIUM CITRATE AND CITRIC ACID MONOHYDRATE 30 ML: 500; 334 SOLUTION ORAL at 11:34

## 2017-03-20 RX ADMIN — SODIUM CHLORIDE, SODIUM LACTATE, POTASSIUM CHLORIDE, CALCIUM CHLORIDE: 600; 310; 30; 20 INJECTION, SOLUTION INTRAVENOUS at 12:12

## 2017-03-20 RX ADMIN — NALBUPHINE HYDROCHLORIDE 10 MG: 10 INJECTION, SOLUTION INTRAMUSCULAR; INTRAVENOUS; SUBCUTANEOUS at 18:01

## 2017-03-20 RX ADMIN — DIPHENHYDRAMINE HYDROCHLORIDE 12.5 MG: 50 INJECTION, SOLUTION INTRAMUSCULAR; INTRAVENOUS at 20:40

## 2017-03-20 RX ADMIN — DIPHENHYDRAMINE HYDROCHLORIDE 12.5 MG: 50 INJECTION, SOLUTION INTRAMUSCULAR; INTRAVENOUS at 15:32

## 2017-03-20 RX ADMIN — KETOROLAC TROMETHAMINE 30 MG: 30 INJECTION, SOLUTION INTRAMUSCULAR; INTRAVENOUS at 12:56

## 2017-03-20 RX ADMIN — KETOROLAC TROMETHAMINE 30 MG: 30 INJECTION, SOLUTION INTRAMUSCULAR at 18:32

## 2017-03-20 RX ADMIN — BUPIVACAINE HYDROCHLORIDE 1.6 ML: 7.5 INJECTION, SOLUTION INTRASPINAL at 12:22

## 2017-03-20 RX ADMIN — ONDANSETRON 4 MG: 2 INJECTION INTRAMUSCULAR; INTRAVENOUS at 12:40

## 2017-03-20 NOTE — OP NOTES
Shyanne Maciel  [id number]      INTRAUTERINE PREGNANCY  SECTION FULL OP NOTE        DATE OF PROCEDURE:  3/20/2017    PREOPERATIVE DIAGNOSIS:  38 weeks gestation of pregnancy [Z3A.38]H/O  section [Z98.891]Hypertension affecting pregnancy, third trimester [O16.3]    POSTOPERATIVE DIAGNOSIS:  repeat c section     ADDITIONAL DIAGNOSES: CHTN, AMA    PROCEDURE: Intrauterine pregnancy,  section    SURGEON:  Mike Araiza MD    ASSISTANT:  none    ANESTHESIA: Spinal  EBL: 009 cc    COMPLICATIONS: none    OPERATIVE PROCEDURE: Patient was placed on the operating room table in the supine position/left lateral tilt. Time out was done to confirm the operating procedure, surgeon, patient and site. Once confirmed by the team, procedure was started. After having adequate regional anesthesia by spinal injection, the patient was prepped and draped in the usual fashion for abdominal surgery. A Pfannenstiel incision was made and carried down sharply through the skin, subcutaneous tissue, and fascia. Fascia was sharply dissected free from underlying rectus muscles. The peritoneum was sharply entered and extended vertically. DeLee bladder blade was then placed over the bladder. The visceroperitoneal reflection over the lower uterine segment was incised transversely and the bladder flap sharply and bluntly developed. The uterus was incised transversely, then extended bluntly, and the infants head was delivered without difficulty and fundal pressure. Fluid was clear. Cord was clamped and cut. Mouth and nose were suctioned clean. The infant was given to the NICU physician present at the time of delivery. The placenta was manually extracted. The uterus was exteriorized, wrapped in a wet lap square, curetted with a dry square, and closed in a double-layered fashion with #1 chromic. Hemostasis appeared adequate. The cul-de-sac was then irrigated and suctioned clean. The uterus was placed in the abdomen.  The gutters were irrigated and suctioned clean. The peritoneum was closed with 2-0 chromic. The fascia was closed with 0 PDS. Running 2-0 plain was used to reapproximate the subcutaneous tissue. 3-0 Monocryl was used in a subcuticular stich and Dermabond was placed. The patient tolerated the procedure well and went to the recovery room in satisfactory condition.

## 2017-03-20 NOTE — IP AVS SNAPSHOT
Current Discharge Medication List  
  
START taking these medications Dose & Instructions Dispensing Information Comments Morning Noon Evening Bedtime  
 ibuprofen 800 mg tablet Commonly known as:  MOTRIN Your last dose was: Your next dose is:    
   
   
 Dose:  800 mg Take 1 Tab by mouth every eight (8) hours as needed. Quantity:  30 Tab Refills:  0  
     
   
   
   
  
 oxyCODONE-acetaminophen 7.5-325 mg per tablet Commonly known as:  PERCOCET 7.5 Your last dose was: Your next dose is:    
   
   
 Dose:  1-2 Tab Take 1-2 Tabs by mouth every four (4) hours as needed. Max Daily Amount: 12 Tabs. Quantity:  60 Tab Refills:  0 CONTINUE these medications which have CHANGED Dose & Instructions Dispensing Information Comments Morning Noon Evening Bedtime  
 labetalol 100 mg tablet Commonly known as:  Robert Crandall What changed:  See the new instructions. Your last dose was: Your next dose is:    
   
   
 Dose:  100 mg Take 1 Tab by mouth two (2) times a day. Indications: hypertension Quantity:  60 Tab Refills:  1 **Patient requests 90 days supply** CONTINUE these medications which have NOT CHANGED Dose & Instructions Dispensing Information Comments Morning Noon Evening Bedtime PRENATAL S 27-0.8 mg Tab tablet Generic drug:  prenatal vit-iron fumarate-fa Your last dose was: Your next dose is:    
   
   
 Dose:  1 Tab Take 1 Tab by mouth daily. Refills:  0 PriLOSEC 10 mg capsule Generic drug:  omeprazole Your last dose was: Your next dose is:    
   
   
 Dose:  10 mg Take 10 mg by mouth daily. Refills:  0 PROAIR HFA 90 mcg/actuation inhaler Generic drug:  albuterol Your last dose was: Your next dose is:    
   
   
 Dose:  2 Puff Take 2 Puffs by inhalation. Refills:  0 ZyrTEC 10 mg tablet Generic drug:  cetirizine Your last dose was: Your next dose is: Take  by mouth two (2) times daily as needed. Refills:  0 Where to Get Your Medications Information on where to get these meds will be given to you by the nurse or doctor. ! Ask your nurse or doctor about these medications  
  ibuprofen 800 mg tablet  
 labetalol 100 mg tablet  
 oxyCODONE-acetaminophen 7.5-325 mg per tablet

## 2017-03-20 NOTE — PROGRESS NOTES
03/20/17 1030   Peripheral IV 03/20/17 Right Forearm   Placement Date/Time: 03/20/17 1030   Number of Attempts: 1  Inserted By: CARISA RN  Size: 18 G  Orientation: Right  Location: Forearm   Site Assessment Clean, dry, & intact   Phlebitis Assessment 0   Infiltration Assessment 0   Dressing Status Clean, dry, & intact   Dressing Type Tape;Transparent   Hub Color/Line Status Green; Infusing   Action Taken Blood drawn   Tolerated procedure well.

## 2017-03-20 NOTE — ANESTHESIA PREPROCEDURE EVALUATION
Anesthetic History   No history of anesthetic complications            Review of Systems / Medical History  Pertinent labs reviewed    Pulmonary            Asthma (rare sxs, URI induced) : well controlled       Neuro/Psych         Headaches (migraines) and psychiatric history    Comments: Sciatica on right   Cardiovascular    Hypertension (chronic and PIH)              Exercise tolerance: >4 METS     GI/Hepatic/Renal     GERD: well controlled           Endo/Other        Obesity     Other Findings            Physical Exam    Airway  Mallampati: II  TM Distance: 4 - 6 cm  Neck ROM: normal range of motion   Mouth opening: Normal     Cardiovascular  Regular rate and rhythm,  S1 and S2 normal,  no murmur, click, rub, or gallop             Dental  No notable dental hx       Pulmonary  Breath sounds clear to auscultation               Abdominal  GI exam deferred       Other Findings            Anesthetic Plan    ASA: 2  Anesthesia type: spinal            Anesthetic plan and risks discussed with: Patient and Spouse

## 2017-03-20 NOTE — L&D DELIVERY NOTE
Delivery Summary    Patient: Moose Stevenson MRN: [id number]  SSN: [id number]    YOB: 1980  Age: 29 y.o. Sex: female        Labor Events:    Labor: No    Rupture Date:      Rupture Time:      Rupture Type: AROM    Amniotic Fluid Volume: Moderate     Amniotic Fluid Description:  Amniotic fluid Odor: Clear    None     Induction: None         Induction Date:        Induction Time:       Indications for Induction:       Augmentation: None    Augmentation Date:      Augmentation Time:      Indications for Augmentation:      Events:        Cervical Ripening:            Rupture Identifier: Rupture 1     Labor complications: None     Additional complications:         Delivery Events:  Estimated Blood Loss (ml): 800      Information for the patient's :  Blaire Alexandra [[id number]]     Delivery Summary - Baby    Delivery Date: 3/20/2017  Delivery Time: 12:39 PM  Delivery Type: , Low Transverse    Section Delivery:     Sex:  male     Gestational Age: 38w0d  Delivery Clinician:  Jaky MERRITT   Living?: Yes  Delivery Location: OR           APGARS  One minute Five minutes Ten minutes   Skin color: 1   1        Heart rate: 2   2        Grimace: 2   2        Muscle tone: 2   2        Breathin   2        Totals: 9   9           Presentation: Vertex    Position: Left Occiput Anterior  Resuscitation Method:  Suctioning-bulb; Tactile Stimulation     Meconium Stained: None      Cord Vessels: 3 Vessels      Cord Events:    Cord Blood Sent?:  Yes    Blood Gases Sent?:  Yes    Placenta:  Date/Time: 3/20 12:41 PM  Removal: Manual Removal      Appearance: Intact     [place] Measurements:  Birth Weight: 7 lb 2.3 oz (3.24 kg)      Birth Length: 1' 7.29\" (0.49 m)      Head Circumference: 1' 1.58\" (0.345 m)      Chest Circumference: 1' 1.58\" (0.345 m)     Abdominal Girth:       Other Providers:           Group Beta Strep:   Lab Results   Component Value Date/Time    Wilfredo External negative 2011        Cord Blood Results:  Information for the patient's :  Ryder Blackman [[id number]]   No results found for: ABORH, PCTABR, PCTDIG, BILI, ABORHEXT, ABORH    Information for the patient's :  Ryder Blackman [[id number]]   No results found for: APH, APCO2, APO2, AHCO3, ABEC, ABDC, O2ST, SITE, [place], PHI, [place], PO2I, HCO3I, SO2I, IBD    Information for the patient's :  Ryder Blackman [[id number]]   No results found for: EPHV, PCO2V, PO2V, HCO3V, O2STV, EBDV

## 2017-03-20 NOTE — ROUTINE PROCESS
SBAR OUT Report: Mother    Verbal report given to Edel Whelan RN on this patient, who is now being transferred to MIU for routine progression of care. The patient is wearing a green \"Anesthesia-Duramorph\" band. Report consisted of patient's Situation, Background, Assessment and Recommendations (SBAR). Eads ID bands were compared with the identification form, and verified with the patient and receiving nurse. Information from the SBAR and the [address] [facility] Report was reviewed with the receiving nurse; opportunity for questions and clarification provided.

## 2017-03-20 NOTE — LACTATION NOTE

## 2017-03-20 NOTE — ROUTINE PROCESS
SBAR IN Report: Mother    Verbal report received from Jena Rincon RN on this patient, who is now being transferred from L&D PACU for routine progression of care. The patient is wearing a green \"Anesthesia-Duramorph\" band. Report consisted of patient's Situation, Background, Assessment and Recommendations (SBAR).  ID bands were compared with the identification form, and verified with the patient and transferring nurse. Information from the SBAR, Procedure Summary, Intake/Output and MAR and the [facility] Report was reviewed with the transferring nurse; opportunity for questions and clarification provided.

## 2017-03-20 NOTE — PROGRESS NOTES
Admission      Admit to room 424 for repeat C/S. Patient of Dr Diane Black. Monitoring applied. Confirmed admission database information obtained per Garima Godwin RN. Patient states no changes. Oriented to room. Discussed POC. Informed patient of pain scale and discussed pain management. Baby friendly details discussed. Pt states she will be breast feeding. Educated on the benefits related to breast feeding. States understanding.

## 2017-03-20 NOTE — PROGRESS NOTES
Report received from [facility], RN. Patient care assumed-bedside reporting completed.

## 2017-03-20 NOTE — PROGRESS NOTES
Patient c/o itching, Benadryl 12.5mg given IV earlier and patient states \"it did nothing. \" Phoned Dr. Dakota Masterson, per Dr. Dakota Masterson patient may have Nubain 10mg IV every 6 hours as needed for itching. Telephone order with read back.

## 2017-03-20 NOTE — LACTATION NOTE
This note was copied from a baby's chart. In to see mom and infant for first time. Mom states infant fed well at first feed. Pancho breast fed her 1st child for 4 months and second child for 6 months. Infant showing feeding cues on moms chest while skin to skin during visit. Encouraged mom to offer breast, but siblings coming in to meet brother for first time. Mom encouraged to call out if desires any help with next feed, she does not want to put infant to breast at this time. Reviewed admission info and 1st 24 hr feeding/output expectations.

## 2017-03-20 NOTE — IP AVS SNAPSHOT
[address] 
[phone] Patient: Angie Hong MRN: [id number] :1980 You are allergic to the following Allergen Reactions Sulfa (Sulfonamide Antibiotics) Other (comments) Anaphylaxis Immune system shuts down, S/S similiar to leukemia, states she will die if she has it again  
    
 Sulfamethoxazole-Trimethoprim Anaphylaxis Other reaction(s): Hospitalized anaphylaxis Immunizations Administered for This Admission Name Date Influenza Vaccine (Quad) PF  Deferred () Tdap 3/23/2017 Recent Documentation Breastfeeding? OB Status Smoking Status Yes Recent pregnancy Never Smoker Emergency Contacts Name Discharge Info Relation Home Work Mobile [address] 52 About your hospitalization You were admitted on:  2017 You last received care in the:  [address] [facility] You were discharged on:  2017 Unit phone number:  [phone] Why you were hospitalized Your primary diagnosis was:  H/O  Section Your diagnoses also included:  Chronic Hypertension, Supervision Of Elderly Multigravida (>=28Years Old At Time Of Delivery) Providers Seen During Your Hospitalizations Provider Role Specialty Primary office phone Hilda Tang MD Attending Provider Obstetrics & Gynecology [phone] Your Primary Care Physician (PCP) Primary Care Physician Office Phone Office Fax [facility] Standard [phone] Follow-up Information Follow up With Details Comments Contact Info Jessica Briseno MD   [address] [facility] [phone] Hilda Tang MD In 2 weeks Call and schedule an appointment to be seen for a 2 week follow up at  [address] [facility] [address] 
[phone] Your Appointments Tuesday April 04, 2017  9:00 AM EDT PostPartum 2 week with Cheri Olmedo MD  
[address] [place] [address] Mary [id number] [phone] Current Discharge Medication List  
  
START taking these medications Dose & Instructions Dispensing Information Comments Morning Noon Evening Bedtime  
 ibuprofen 800 mg tablet Commonly known as:  MOTRIN Your last dose was: Your next dose is:    
   
   
 Dose:  800 mg Take 1 Tab by mouth every eight (8) hours as needed. Quantity:  30 Tab Refills:  0  
     
   
   
   
  
 oxyCODONE-acetaminophen 7.5-325 mg per tablet Commonly known as:  PERCOCET 7.5 Your last dose was: Your next dose is:    
   
   
 Dose:  1-2 Tab Take 1-2 Tabs by mouth every four (4) hours as needed. Max Daily Amount: 12 Tabs. Quantity:  60 Tab Refills:  0 CONTINUE these medications which have CHANGED Dose & Instructions Dispensing Information Comments Morning Noon Evening Bedtime  
 labetalol 100 mg tablet Commonly known as:  Drusillpadmini Carlson What changed:  See the new instructions. Your last dose was: Your next dose is:    
   
   
 Dose:  100 mg Take 1 Tab by mouth two (2) times a day. Indications: hypertension Quantity:  60 Tab Refills:  1 **Patient requests 90 days supply** CONTINUE these medications which have NOT CHANGED Dose & Instructions Dispensing Information Comments Morning Noon Evening Bedtime PRENATAL S 27-0.8 mg Tab tablet Generic drug:  prenatal vit-iron fumarate-fa Your last dose was: Your next dose is:    
   
   
 Dose:  1 Tab Take 1 Tab by mouth daily. Refills:  0 PriLOSEC 10 mg capsule Generic drug:  omeprazole Your last dose was: Your next dose is:    
   
   
 Dose:  10 mg Take 10 mg by mouth daily. Refills:  0 PROAIR HFA 90 mcg/actuation inhaler Generic drug:  albuterol Your last dose was: Your next dose is:    
   
   
 Dose:  2 Puff Take 2 Puffs by inhalation. Refills:  0 ZyrTEC 10 mg tablet Generic drug:  cetirizine Your last dose was: Your next dose is: Take  by mouth two (2) times daily as needed. Refills:  0 Where to Get Your Medications Information on where to get these meds will be given to you by the nurse or doctor. ! Ask your nurse or doctor about these medications  
  ibuprofen 800 mg tablet  
 labetalol 100 mg tablet  
 oxyCODONE-acetaminophen 7.5-325 mg per tablet Discharge Instructions  Section: What to Expect at [facility] Your Recovery A  section, or , is surgery to deliver your baby through a cut, called an incision, that the doctor makes in your lower belly and uterus. You may have some pain in your lower belly and need pain medicine for 1 to 2 weeks. You can expect some vaginal bleeding for several weeks. You will probably need about 6 weeks to fully recover. It is important to take it easy while the incision is healing. Avoid heavy lifting, strenuous activities, or exercises that strain the belly muscles while you are recovering. Ask a family member or friend for help with housework, cooking, and shopping. This care sheet gives you a general idea about how long it will take for you to recover. But each person recovers at a different pace. Follow the steps below to get better as quickly as possible. How can you care for yourself at home? Activity · Rest when you feel tired. Getting enough sleep will help you recover. · Try to walk each day.  Start by walking a little more than you did the day before. Bit by bit, increase the amount you walk. Walking boosts blood flow and helps prevent pneumonia, constipation, and blood clots. · Avoid strenuous activities, such as bicycle riding, jogging, weightlifting, and aerobic exercise, for 6 weeks or until your doctor says it is okay. · Until your doctor says it is okay, do not lift anything heavier than your baby. · Do not do sit-ups or other exercises that strain the belly muscles for 6 weeks or until your doctor says it is okay. · Hold a pillow over your incision when you cough or take deep breaths. This will support your belly and decrease your pain. · You may shower as usual. Pat the incision dry when you are done. · You will have some vaginal bleeding. Wear sanitary pads. Do not douche or use tampons until your doctor says it is okay. · Ask your doctor when you can drive again. · You will probably need to take at least 6 weeks off work. It depends on the type of work you do and how you feel. · Ask your doctor when it is okay for you to have sex. Diet · You can eat your normal diet. If your stomach is upset, try bland, low-fat foods like plain rice, broiled chicken, toast, and yogurt. · Drink plenty of fluids (unless your doctor tells you not to). · You may notice that your bowel movements are not regular right after your surgery. This is common. Try to avoid constipation and straining with bowel movements. You may want to take a fiber supplement every day. If you have not had a bowel movement after a couple of days, ask your doctor about taking a mild laxative. · If you are breastfeeding, do not drink any alcohol. Medicines · Your doctor will tell you if and when you can restart your medicines. He or she will also give you instructions about taking any new medicines. · If you take blood thinners, such as warfarin (Coumadin), clopidogrel (Plavix), or aspirin, be sure to talk to your doctor.  He or she will tell you if and when to start taking those medicines again. Make sure that you understand exactly what your doctor wants you to do. · Take pain medicines exactly as directed. ¨ If the doctor gave you a prescription medicine for pain, take it as prescribed. ¨ If you are not taking a prescription pain medicine, ask your doctor if you can take an over-the-counter medicine. · If you think your pain medicine is making you sick to your stomach: 
¨ Take your medicine after meals (unless your doctor has told you not to). ¨ Ask your doctor for a different pain medicine. · If your doctor prescribed antibiotics, take them as directed. Do not stop taking them just because you feel better. You need to take the full course of antibiotics. Incision care · If you have strips of tape on the incision, leave the tape on for a week or until it falls off. · Wash the area daily with warm, soapy water, and pat it dry. Don't use hydrogen peroxide or alcohol, which can slow healing. You may cover the area with a gauze bandage if it weeps or rubs against clothing. Change the bandage every day. · Keep the area clean and dry. Other instructions · If you breastfeed your baby, you may be more comfortable while you are healing if you place the baby so that he or she is not resting on your belly. Try tucking your baby under your arm, with his or her body along the side you will be feeding on. Support your baby's upper body with your arm. With that hand you can control your baby's head to bring his or her mouth to your breast. This is sometimes called the football hold. Follow-up care is a key part of your treatment and safety. Be sure to make and go to all appointments, and call your doctor if you are having problems. It's also a good idea to know your test results and keep a list of the medicines you take. When should you call for help? Call 911 anytime you think you may need emergency care. For example, call if: · You passed out (lost consciousness). · You have symptoms of a blood clot in your lung (called a pulmonary embolism). These may include: 
¨ Sudden chest pain. ¨ Trouble breathing. ¨ Coughing up blood. · You have thoughts of harming yourself, your baby, or another person. Call your doctor now or seek immediate medical care if: 
· You have severe vaginal bleeding. This means that you are soaking through a pad every hour for 2 or more hours. · You are dizzy or lightheaded, or you feel like you may faint. · You have new or more belly pain. · You have loose stitches, or your incision comes open. · You have symptoms of infection, such as: 
¨ Increased pain, swelling, warmth, or redness. ¨ Red streaks leading from the incision. ¨ Pus draining from the incision. ¨ A fever. · You have symptoms of a blood clot in your leg (called a deep vein thrombosis), such as: 
¨ Pain in your calf, back of the knee, thigh, or groin. ¨ Redness and swelling in your leg or groin. Watch closely for changes in your health, and be sure to contact your doctor if: 
· You feel sad, anxious, or hopeless for more than a few days. · You do not get better as expected. Where can you learn more? Go to http://orlin-vitaliy.info/. Enter M806 in the search box to learn more about \" Section: What to Expect at Home. \" Current as of: May 30, 2016 Content Version: 11.1 © 8428-4040 Healthwise, Incorporated. Care instructions adapted under license by [employer] (which disclaims liability or warranty for this information). If you have questions about a medical condition or this instruction, always ask your healthcare professional. [place] [address] any warranty or liability for your use of this information. Discharge Orders None [facility] Announcement  We are excited to announce that we are making your provider's discharge notes available to you in [employer]. You will see these notes when they are completed and signed by the physician that discharged you from your recent hospital stay. If you have any questions or concerns about any information you see in [employer], please call the Health Information Department where you were seen or reach out to your Primary Care Provider for more information about your plan of care. Introducing [facility]! Lorena Gomez introduces [employer] patient portal. Now you can access parts of your medical record, email your doctor's office, and request medication refills online. 1. In your internet browser, go to [email or web address] 2. Click on the First Time User? Click Here link in the Sign In box. You will see the New Member Sign Up page. 3. Enter your [employer] Access Code exactly as it appears below. You will not need to use this code after youve completed the sign-up process. If you do not sign up before the expiration date, you must request a new code. · [employer] Access Code: [email or web address] Expires: 4/3/2017 10:12 AM 
 
4. Enter the last four digits of your Social Security Number (xxxx) and Date of Birth (mm/dd/yyyy) as indicated and click Submit. You will be taken to the next sign-up page. 5. Create a OpDemandt ID. This will be your [employer] login ID and cannot be changed, so think of one that is secure and easy to remember. 6. Create a [employer] password. You can change your password at any time. 7. Enter your Password Reset Question and Answer. This can be used at a later time if you forget your password. 8. Enter your e-mail address. You will receive e-mail notification when new information is available in [address] 9. Click Sign Up. You can now view and download portions of your medical record. 10. Click the Download Summary menu link to download a portable copy of your medical information. If you have questions, please visit the Frequently Asked Questions section of the MyChart website. Remember, MyChart is NOT to be used for urgent needs. For medical emergencies, dial 911. Now available from your iPhone and Android! General Information Please provide this summary of care documentation to your next provider. Patient Signature:  ____________________________________________________________ Date:  ____________________________________________________________  
  
Suzon Mems Provider Signature:  ____________________________________________________________ Date:  ____________________________________________________________

## 2017-03-20 NOTE — PROGRESS NOTES
SBAR OUT Report: Mother    Verbal report given to Dimitris Garcia RN (full name & credentials) on this patient, who is now being transferred to MIU (unit) for routine progression of care. The patient is wearing a green \"Anesthesia-Duramorph\" band. Report consisted of patient's Situation, Background, Assessment and Recommendations (SBAR). [place] ID bands were compared with the identification form, and verified with the patient and receiving nurse. Information from the SBAR and the [address] [facility] Report was reviewed with the receiving nurse; opportunity for questions and clarification provided.

## 2017-03-20 NOTE — ANESTHESIA POSTPROCEDURE EVALUATION
Post-Anesthesia Evaluation and Assessment    Patient: Jolie Smith MRN: [id number]  SSN: [id number]    YOB: 1980  Age: 29 y.o. Sex: female       Cardiovascular Function/Vital Signs  Visit Vitals    /67 (BP 1 Location: Left arm, BP Patient Position: At rest)    Pulse 69    Temp 36.6 °C (97.8 °F)    Resp 18    SpO2 97%    Breastfeeding Yes       Patient is status post spinal anesthesia for Procedure(s):   SECTION. Nausea/Vomiting: None    Postoperative hydration reviewed and adequate. Pain:  Pain Scale 1: Numeric (0 - 10) (17 1522)  Pain Intensity 1: 3 (17 1522)   Managed    Neurological Status:   Legs mostly at baseline, slight tingling but able to move them easily. Mental Status and Level of Consciousness: Awake. Pulmonary Status:   O2 Device: Room air (17 1530)   Adequate oxygenation and airway patent    Complications related to anesthesia: None    Post-anesthesia assessment completed.  No concerns    Signed By: Fam Kc MD     2017

## 2017-03-20 NOTE — H&P
History & Physical    Name: Shyanne Maciel MRN: [id number]  SSN: [id number]    YOB: 1980  Age: 29 y.o. Sex: female      Subjective:     Estimated Date of Delivery: 4/3/17  OB History    Para Term  AB SAB TAB Ectopic Multiple Living   3 2 2 0 0 0 0 0 0 2      # Outcome Date GA Lbr Honorio/2nd Weight Sex Delivery Anes PTL Lv   3 Current            2 Term 10/10/11 39w1d  8 lb 3.2 oz (3.72 kg) F  LO SPINAL AN N Y   1 Term 08   7 lb 2 oz (3.232 kg) F    Y          Ms. Edmonds admitted with pregnancy at 38w0d for  section due to previous  section. Prenatal course was complicated by advanced maternal age and chronic hypertension. Please see prenatal records for details. Past Medical History:   Diagnosis Date    Anxiety     Asthma     Chronic hypertension 2016    Collar bone fracture     Depression     Migraines     OTC meds only    PIH (pregnancy induced hypertension)     Was taking Propranolol    Sciatica 2013    Supervision of elderly multigravida (>=28years old at time of delivery) 2016    Supervision of high-risk pregnancy 2016    Varicella      Past Surgical History:   Procedure Laterality Date     DELIVERY ONLY  04/03/2008    x2    HX OTHER SURGICAL      Bone Marrow BX    HX WISDOM TEETH EXTRACTION       Social History     Occupational History    Not on file.      Social History Main Topics    Smoking status: Never Smoker    Smokeless tobacco: Never Used    Alcohol use No    Drug use: No    Sexual activity: Yes     Partners: Male     Birth control/ protection: None     Family History   Problem Relation Age of Onset   [facility] Arthritis-osteo Mother     Other Mother      Fibromyalgia    Elevated Lipids Father     Hypertension Father     Cancer Father      PROSTATE    Arthritis-osteo Maternal Aunt     Asthma Maternal Grandmother     Arthritis-osteo Maternal Grandmother     Cancer Maternal Grandmother      MELANOMA    Breast Cancer Paternal Grandmother     Hypertension Paternal Grandmother        Allergies   Allergen Reactions    Sulfa (Sulfonamide Antibiotics) Other (comments) and Anaphylaxis     Immune system shuts down, S/S similiar to leukemia, states she will die if she has it again    Sulfamethoxazole-Trimethoprim Anaphylaxis     Other reaction(s): Hospitalized anaphylaxis                                      Prior to Admission medications    Medication Sig Start Date End Date Taking? Authorizing Provider   labetalol (NORMODYNE) 100 mg tablet TAKE 1 TABLET BY MOUTH EVERY 8 HOURS 2/27/17  Yes Carl Tim MD   omeprazole (PRILOSEC) 10 mg capsule Take 10 mg by mouth daily. Yes Historical Provider   prenatal vit-iron fumarate-fa (PRENATAL S) 27-0.8 mg Tab tablet Take 1 Tab by mouth daily. Yes Historical Provider   cetirizine (ZYRTEC) 10 mg tablet Take  by mouth two (2) times daily as needed. Yes Historical Provider   albuterol (PROAIR HFA) 90 mcg/actuation inhaler Take 2 Puffs by inhalation. 11/29/16 11/29/17  Historical Provider        Review of Systems: A comprehensive review of systems was negative except for that written in the History of Present Illness.     Objective:     Vitals:  Vitals:    03/20/17 1018   BP: 121/84   Pulse: 98   Resp: 20   Temp: 98.1 °F (36.7 °C)        Physical Exam:  Heart: Regular rate and rhythm  Lung: clear to auscultation throughout lung fields, no wheezes, no rales, no rhonchi and normal respiratory effort  Lower Extremities:  - Edema 1+  Membranes:  Intact  Fetal Heart Rate: Reactive    Prenatal Labs:   Lab Results   Component Value Date/Time    ABO/Rh(D) O POSITIVE 03/20/2017 10:30 AM    Rubella, External immune 03/11/2011    GrBStrep, External negative 09/14/2011    HBsAg, External NR 03/11/2011    HIV, External NR 03/11/2011    RPR, External NR 03/11/2011    Gonorrhea, External neg 09/29/2016    Chlamydia, External neg 09/29/2016    ABO,Rh O + 2011         Impression/Plan:     Plan:  Admit for  section. Group B Strep was negative. Discussed the risks of surgery including the risks of bleeding, infection, deep vein thrombosis, and surgical injuries to internal organs including but not limited to the bowels, bladder, rectum, and female reproductive organs. The patient understands the risks; any and all questions were answered to the patient's satisfaction.     Signed By:  Radha Burnette MD     2017

## 2017-03-20 NOTE — ANESTHESIA PROCEDURE NOTES
Spinal Block    Performed by: Bassam Sosa  Authorized by: Bassam Sosa     Pre-procedure:   Indications: primary anesthetic  Preanesthetic Checklist: patient identified, risks and benefits discussed, anesthesia consent, patient being monitored and timeout performed    Timeout Time: 12:16          Spinal Block:   Patient Position:  Seated  Prep Region:  Lumbar  Prep: chlorhexidine and patient draped      Location:  L3-4  Technique:  Single shot  Local:  Lidocaine 1%  Local Dose (mL):  3    Needle:   Needle Type:  Pencan  Needle Gauge:  25 G  Attempts:  1      Events: CSF confirmed, no blood with aspiration and no paresthesia        Assessment:  Insertion:  Uncomplicated  Patient tolerance:  Patient tolerated the procedure well with no immediate complications

## 2017-03-21 LAB
HCT VFR BLD AUTO: 29.5 % (ref 35.8–46.3)
HGB BLD-MCNC: 9.3 G/DL (ref 11.7–15.4)

## 2017-03-21 PROCEDURE — 85018 HEMOGLOBIN: CPT | Performed by: OBSTETRICS & GYNECOLOGY

## 2017-03-21 PROCEDURE — 74011250637 HC RX REV CODE- 250/637: Performed by: ANESTHESIOLOGY

## 2017-03-21 PROCEDURE — 74011250636 HC RX REV CODE- 250/636: Performed by: ANESTHESIOLOGY

## 2017-03-21 PROCEDURE — 36415 COLL VENOUS BLD VENIPUNCTURE: CPT | Performed by: OBSTETRICS & GYNECOLOGY

## 2017-03-21 PROCEDURE — 65270000029 HC RM PRIVATE

## 2017-03-21 PROCEDURE — 74011250637 HC RX REV CODE- 250/637: Performed by: OBSTETRICS & GYNECOLOGY

## 2017-03-21 RX ORDER — SIMETHICONE 80 MG
80 TABLET,CHEWABLE ORAL
Status: DISCONTINUED | OUTPATIENT
Start: 2017-03-21 | End: 2017-03-23 | Stop reason: HOSPADM

## 2017-03-21 RX ORDER — PROMETHAZINE HYDROCHLORIDE 25 MG/1
25 TABLET ORAL
Status: DISCONTINUED | OUTPATIENT
Start: 2017-03-21 | End: 2017-03-23 | Stop reason: HOSPADM

## 2017-03-21 RX ORDER — DEXTROSE, SODIUM CHLORIDE, SODIUM LACTATE, POTASSIUM CHLORIDE, AND CALCIUM CHLORIDE 5; .6; .31; .03; .02 G/100ML; G/100ML; G/100ML; G/100ML; G/100ML
125 INJECTION, SOLUTION INTRAVENOUS CONTINUOUS
Status: DISCONTINUED | OUTPATIENT
Start: 2017-03-21 | End: 2017-03-21

## 2017-03-21 RX ORDER — SODIUM CHLORIDE 0.9 % (FLUSH) 0.9 %
5-10 SYRINGE (ML) INJECTION AS NEEDED
Status: DISCONTINUED | OUTPATIENT
Start: 2017-03-21 | End: 2017-03-23 | Stop reason: HOSPADM

## 2017-03-21 RX ORDER — IBUPROFEN 800 MG/1
800 TABLET ORAL
Status: DISCONTINUED | OUTPATIENT
Start: 2017-03-21 | End: 2017-03-23 | Stop reason: HOSPADM

## 2017-03-21 RX ORDER — SODIUM CHLORIDE 0.9 % (FLUSH) 0.9 %
5-10 SYRINGE (ML) INJECTION EVERY 8 HOURS
Status: DISCONTINUED | OUTPATIENT
Start: 2017-03-21 | End: 2017-03-21

## 2017-03-21 RX ORDER — PRENATAL VIT 96/IRON FUM/FOLIC 27MG-0.8MG
1 TABLET ORAL DAILY
Status: DISCONTINUED | OUTPATIENT
Start: 2017-03-22 | End: 2017-03-23 | Stop reason: HOSPADM

## 2017-03-21 RX ORDER — OXYCODONE AND ACETAMINOPHEN 7.5; 325 MG/1; MG/1
2 TABLET ORAL
Status: DISCONTINUED | OUTPATIENT
Start: 2017-03-21 | End: 2017-03-23 | Stop reason: HOSPADM

## 2017-03-21 RX ORDER — DIPHENHYDRAMINE HCL 25 MG
25 CAPSULE ORAL
Status: DISCONTINUED | OUTPATIENT
Start: 2017-03-21 | End: 2017-03-23 | Stop reason: HOSPADM

## 2017-03-21 RX ORDER — DOCUSATE SODIUM 100 MG/1
100 CAPSULE, LIQUID FILLED ORAL 2 TIMES DAILY
Status: DISCONTINUED | OUTPATIENT
Start: 2017-03-21 | End: 2017-03-23 | Stop reason: HOSPADM

## 2017-03-21 RX ORDER — OXYCODONE AND ACETAMINOPHEN 7.5; 325 MG/1; MG/1
1 TABLET ORAL
Status: DISCONTINUED | OUTPATIENT
Start: 2017-03-21 | End: 2017-03-23 | Stop reason: HOSPADM

## 2017-03-21 RX ADMIN — OXYCODONE HYDROCHLORIDE AND ACETAMINOPHEN 1 TABLET: 7.5; 325 TABLET ORAL at 15:29

## 2017-03-21 RX ADMIN — KETOROLAC TROMETHAMINE 30 MG: 30 INJECTION, SOLUTION INTRAMUSCULAR at 10:38

## 2017-03-21 RX ADMIN — KETOROLAC TROMETHAMINE 30 MG: 30 INJECTION, SOLUTION INTRAMUSCULAR at 03:57

## 2017-03-21 RX ADMIN — SIMETHICONE CHEW TAB 80 MG 80 MG: 80 TABLET ORAL at 18:10

## 2017-03-21 RX ADMIN — LABETALOL HCL 50 MG: 100 TABLET, FILM COATED ORAL at 15:29

## 2017-03-21 RX ADMIN — ACETAMINOPHEN 1000 MG: 500 TABLET, FILM COATED ORAL at 08:03

## 2017-03-21 RX ADMIN — IBUPROFEN 800 MG: 800 TABLET ORAL at 18:10

## 2017-03-21 RX ADMIN — DOCUSATE SODIUM 100 MG: 100 CAPSULE, LIQUID FILLED ORAL at 18:10

## 2017-03-21 RX ADMIN — OXYCODONE HYDROCHLORIDE AND ACETAMINOPHEN 2 TABLET: 7.5; 325 TABLET ORAL at 19:11

## 2017-03-21 RX ADMIN — SIMETHICONE CHEW TAB 80 MG 80 MG: 80 TABLET ORAL at 22:00

## 2017-03-21 RX ADMIN — NALBUPHINE HYDROCHLORIDE 10 MG: 10 INJECTION, SOLUTION INTRAMUSCULAR; INTRAVENOUS; SUBCUTANEOUS at 00:43

## 2017-03-21 NOTE — PROGRESS NOTES
IV converted to saline lock, [place] d/c with bulb deflated without difficulty. SCD's d/c. Pt then assisted up to BR, denied c/o dizziness or weakness. Pt was able to void a few drops while on toilet. Melani care then explained to pt and she demonstrated good understanding. Gown changed and then pt back to bed with minimal assistance. Pt denies needs at this time.

## 2017-03-21 NOTE — PROGRESS NOTES
Report of care received from, Deanna Jones RN. Bedside report given, pt denies further needs at present time.

## 2017-03-21 NOTE — PROGRESS NOTES
Anesthesiology  Post-op Note    Post-op day 1 s/p  via spinal with neuraxial opioids for post-op pain management. Visit Vitals    /57 (BP 1 Location: Left arm, BP Patient Position: At rest)    Pulse 86    Temp 36.6 °C (97.9 °F)    Resp 17    LMP 2016    SpO2 97%    Breastfeeding Yes     Airway patent, patient appropriately hydrated and appears euvolemic. Patient is Alert and oriented. Pain is well controlled. Pruritus is absent. Nausea is absent. No complaints about back or site of injection. Motor and sensory function has returned to baseline in lower extremities. Patient is satisfied with anesthetic and reports no complications. Continue current orders, then initiate surgeon's orders for pain management 24 hours after . Follow up per surgeon.

## 2017-03-21 NOTE — PROGRESS NOTES
Post-Operative Day Number 1 Progress Note    Patient doing well post-op day 1 from  delivery without significant complaints. Pain controlled on current medication. Voiding without difficulty, normal lochia. Vitals:  Patient Vitals for the past 8 hrs:   BP Temp Pulse Resp   17 0705 133/51 98.1 °F (36.7 °C) 90 18   17 0400 136/57 97.9 °F (36.6 °C) 86 17     Temp (24hrs), Av.1 °F (36.7 °C), Min:97.8 °F (36.6 °C), Max:98.7 °F (37.1 °C)      Vital signs stable, afebrile. Exam:  Patient without distress. Abdomen soft, fundus firm at level of umbilicus, non tender. Incision dry and clean without erythema. Lower extremities are negative for swelling, cords or tenderness. Lab/Data Review:  CBC:   Lab Results   Component Value Date/Time    WBC 9.8 2017 10:30 AM    HGB 9.3 (L) 2017 06:15 AM    HCT 29.5 (L) 2017 06:15 AM     2017 10:30 AM       Assessment and Plan:  Patient appears to be having uncomplicated post- course. Continue routine post-op care and maternal education.

## 2017-03-21 NOTE — PROGRESS NOTES
Pt up to bathroom, adequate void noted. Melani care done by patient. Pt back to bed without difficulty. Encouraged to call for needs or concerns. Verbalized understanding.

## 2017-03-21 NOTE — PROGRESS NOTES
Patient questioning about taking Labetalol this AM with current BP. Told patient we can hold AM dose and recheck BP at 11am. Instructed patient to continue to keep track of I&O's.

## 2017-03-21 NOTE — PROGRESS NOTES
Percocet given to pt per request.  Educated pt to call out if pain medication does not help. Primary RN aware.

## 2017-03-22 PROCEDURE — 65270000029 HC RM PRIVATE

## 2017-03-22 PROCEDURE — 74011250637 HC RX REV CODE- 250/637: Performed by: OBSTETRICS & GYNECOLOGY

## 2017-03-22 RX ADMIN — SIMETHICONE CHEW TAB 80 MG 80 MG: 80 TABLET ORAL at 13:59

## 2017-03-22 RX ADMIN — IBUPROFEN 800 MG: 800 TABLET ORAL at 13:59

## 2017-03-22 RX ADMIN — OXYCODONE HYDROCHLORIDE AND ACETAMINOPHEN 1 TABLET: 7.5; 325 TABLET ORAL at 22:59

## 2017-03-22 RX ADMIN — SIMETHICONE CHEW TAB 80 MG 80 MG: 80 TABLET ORAL at 18:28

## 2017-03-22 RX ADMIN — OXYCODONE HYDROCHLORIDE AND ACETAMINOPHEN 1 TABLET: 7.5; 325 TABLET ORAL at 18:28

## 2017-03-22 RX ADMIN — IBUPROFEN 800 MG: 800 TABLET ORAL at 03:50

## 2017-03-22 RX ADMIN — OXYCODONE HYDROCHLORIDE AND ACETAMINOPHEN 1 TABLET: 7.5; 325 TABLET ORAL at 08:56

## 2017-03-22 RX ADMIN — LABETALOL HCL 100 MG: 100 TABLET, FILM COATED ORAL at 03:50

## 2017-03-22 RX ADMIN — LABETALOL HCL 100 MG: 100 TABLET, FILM COATED ORAL at 15:56

## 2017-03-22 RX ADMIN — PRENATAL VIT W/ FE FUMARATE-FA TAB 27-0.8 MG 1 TABLET: 27-0.8 TAB at 08:56

## 2017-03-22 RX ADMIN — SIMETHICONE CHEW TAB 80 MG 80 MG: 80 TABLET ORAL at 08:56

## 2017-03-22 RX ADMIN — DOCUSATE SODIUM 100 MG: 100 CAPSULE, LIQUID FILLED ORAL at 18:28

## 2017-03-22 RX ADMIN — IBUPROFEN 800 MG: 800 TABLET ORAL at 19:46

## 2017-03-22 RX ADMIN — SIMETHICONE CHEW TAB 80 MG 80 MG: 80 TABLET ORAL at 22:59

## 2017-03-22 RX ADMIN — OXYCODONE HYDROCHLORIDE AND ACETAMINOPHEN 1 TABLET: 7.5; 325 TABLET ORAL at 13:59

## 2017-03-22 RX ADMIN — DOCUSATE SODIUM 100 MG: 100 CAPSULE, LIQUID FILLED ORAL at 08:56

## 2017-03-22 NOTE — PROGRESS NOTES
Scheduled medication -- labetalol -- given. Encouraged pt. To call with needs. Pt. Verbalized understanding.

## 2017-03-22 NOTE — PROGRESS NOTES
Scheduled medication -- mylicon -- given. Pt. Requested pain medication gave, motrin 800 mg and 1 percocet 7.5 mg, PO. Encouraged pt. To call with needs. Pt. Verbalized understanding.

## 2017-03-22 NOTE — PROGRESS NOTES
Bedside report received from [facility]. Care assumed. Denies needs at present.

## 2017-03-22 NOTE — PROGRESS NOTES
SW assessment due to history of depression/anxiety.  met with patient and . Patient states that she experienced a brief stint of mild depression during a previous pregnancy. She did not require medication/therapy, but she relied on her virgilio and the depression subsided.  provided education and literature on support available thru [employer] ([employer]). [employer] Warmline:  5-092-018-4PPD (3234). WWW. POSTPARTUM. NET    Family was informed of signs/symptoms, forms of intervention (medication, counseling, education), and resources (local coordinators available telephonically, monthly support group in [facility] with expert\" phone sessions). Discussed importance of self-care and accepting help when offered. Family was encouraged to contact me with any questions/needs -  contact information provided.       Robert King, [address] [facility]

## 2017-03-22 NOTE — PROGRESS NOTES
Scheduled medication -- colace and mylicon -- given. Pt. Requested pain medication gave, percocet 7.5 mg, PO. Encouraged pt. To call with needs. Pt. Verbalized understanding.

## 2017-03-22 NOTE — PROGRESS NOTES
Report received from Pedro Seay RN; assumed pt. Care at present time. Bed-side report completed. Pt. Resting quietly with eyes closed; respirations were even and unlabored.

## 2017-03-22 NOTE — LACTATION NOTE
Mom called for feeding assistance. Mom states infant has been very sleepy today. Just nursed x15 min on one breast but unable to wake infant for second side. Offered to assist with latch on second side. Mom declined stating she wants to supplement. Reviewed no medical indication for supplementation at this time. Mom concerned about infant's intake and chooses to supplement. Permission to supplement signed by mother. Similac provided. Curve tip syringe technique taught to mother. Mother fed infant 10 ml via syringe. Reviewed need for adequate stimulation and recommended pumping if providing supplement. Mom in agreement. Pump and pump kit provided with full instructions for use, collection, and cleaning. Assisted to double pump x15 min. Retrieved 5 ml. Infant sleeping in bassinet. Mom to give after next feeding. Encouraged to breastfeed first, pump if needed (poor feeding, no latch, or insurance pumping if mom desires), give all colostrum to infant, and then supplement if mom chooses. Mom verbalized understanding. Encouraged mom to call for assistance as needed. Lactation to follow up.

## 2017-03-22 NOTE — PROGRESS NOTES
Post-Operative Day Number 2 Progress Note    Patient doing well post-op day 2 from  delivery without significant complaints. Pain controlled on current medication. Voiding without difficulty, normal lochia. Vitals:  Patient Vitals for the past 8 hrs:   BP Temp Pulse Resp   17 0730 139/66 98.3 °F (36.8 °C) 91 18   17 0350 131/81 - 86 -     Temp (24hrs), Av.3 °F (36.8 °C), Min:98 °F (36.7 °C), Max:98.5 °F (36.9 °C)      Vital signs stable, afebrile. Exam:  Patient without distress. Abdomen soft, fundus firm at level of umbilicus, non tender. Incision dry and clean without erythema. Lower extremities are negative for swelling, cords or tenderness. Lab/Data Review:  Lab results reviewed. For significant abnormal values and values requiring intervention, see assessment and plan. Assessment and Plan:  Patient appears to be having uncomplicated post- course. Continue routine post-op care and maternal education.

## 2017-03-22 NOTE — PROGRESS NOTES
Scheduled medication -- colace, pre-, and mylicon -- given. Pt. Requested pain medication gave, 1 percocet 7.5 mg, PO. Encouraged pt. To call with needs. Pt. Verbalized understanding.

## 2017-03-22 NOTE — PROGRESS NOTES
Assessment completed. Assessment completed per flow sheet protocol; plan of care discussed with pt.; questions encouraged and answered to pt.'s satisfaction. Pt. denies any complaints at present time. Encouraged pt. to call out if any needs arise. Pt. verbalized understanding.

## 2017-03-23 VITALS
TEMPERATURE: 97 F | HEART RATE: 76 BPM | OXYGEN SATURATION: 97 % | SYSTOLIC BLOOD PRESSURE: 141 MMHG | DIASTOLIC BLOOD PRESSURE: 75 MMHG | RESPIRATION RATE: 18 BRPM

## 2017-03-23 PROCEDURE — 74011250637 HC RX REV CODE- 250/637: Performed by: OBSTETRICS & GYNECOLOGY

## 2017-03-23 PROCEDURE — 90715 TDAP VACCINE 7 YRS/> IM: CPT | Performed by: OBSTETRICS & GYNECOLOGY

## 2017-03-23 PROCEDURE — 74011250636 HC RX REV CODE- 250/636: Performed by: OBSTETRICS & GYNECOLOGY

## 2017-03-23 RX ORDER — OXYCODONE AND ACETAMINOPHEN 7.5; 325 MG/1; MG/1
1-2 TABLET ORAL
Qty: 60 TAB | Refills: 0 | Status: SHIPPED | OUTPATIENT
Start: 2017-03-23

## 2017-03-23 RX ORDER — IBUPROFEN 800 MG/1
800 TABLET ORAL
Qty: 30 TAB | Refills: 0 | Status: SHIPPED | OUTPATIENT
Start: 2017-03-23 | End: 2017-03-26

## 2017-03-23 RX ORDER — LABETALOL 100 MG/1
100 TABLET, FILM COATED ORAL 2 TIMES DAILY
Qty: 60 TAB | Refills: 1 | Status: SHIPPED | OUTPATIENT
Start: 2017-03-23 | End: 2017-03-26

## 2017-03-23 RX ADMIN — SIMETHICONE CHEW TAB 80 MG 80 MG: 80 TABLET ORAL at 08:32

## 2017-03-23 RX ADMIN — OXYCODONE HYDROCHLORIDE AND ACETAMINOPHEN 1 TABLET: 7.5; 325 TABLET ORAL at 03:26

## 2017-03-23 RX ADMIN — IBUPROFEN 800 MG: 800 TABLET ORAL at 02:16

## 2017-03-23 RX ADMIN — OXYCODONE HYDROCHLORIDE AND ACETAMINOPHEN 1 TABLET: 7.5; 325 TABLET ORAL at 08:32

## 2017-03-23 RX ADMIN — IBUPROFEN 800 MG: 800 TABLET ORAL at 08:32

## 2017-03-23 RX ADMIN — PRENATAL VIT W/ FE FUMARATE-FA TAB 27-0.8 MG 1 TABLET: 27-0.8 TAB at 08:31

## 2017-03-23 RX ADMIN — DOCUSATE SODIUM 100 MG: 100 CAPSULE, LIQUID FILLED ORAL at 08:32

## 2017-03-23 RX ADMIN — TETANUS TOXOID, REDUCED DIPHTHERIA TOXOID AND ACELLULAR PERTUSSIS VACCINE, ADSORBED 0.5 ML: 5; 2.5; 8; 8; 2.5 SUSPENSION INTRAMUSCULAR at 09:48

## 2017-03-23 RX ADMIN — LABETALOL HCL 100 MG: 100 TABLET, FILM COATED ORAL at 04:03

## 2017-03-23 NOTE — LACTATION NOTE
In to see mom and baby for lactation visit prior to discharge. Mom holding baby skin to skin and infant is asleep. Mom reports she just finished feeding baby for 15 minutes on one side but then baby too sleepy to try other side. Assisted mom in waking infant and placing in football position on left. Infant sleepy but able to latch easily. Frequent stimulation required. Still at breast at end of visit. Reviewed packet with mom. Discussed importance of frequent feedings, at least 8 feedings in 24 hours or more with feeding cues, waking if necessary. Discussed supply and demand nature of milk production and that mom will typically produce more milk with increased stimulation to the breasts. Encouraged to \"insurance pump\" any time she uses any formula but also after several feedings per day. Give baby any pumped milk in curved tip syringe. Will follow-up at [facility] tomorrow for pediatrician visit. Advised to follow-up with pediatrician as directed or earlier with any problems such as refused feedings, decreased output, signs of jaundice etc.  Also encouraged to call us with any breastfeeding questions. Mom voices understanding of all.

## 2017-03-23 NOTE — PROGRESS NOTES
Pt discharged to home after ID bands verified and newborns code alert removed. Discharge teaching complete, pt verbalizes understanding; questions encouraged. Mom transferred by wheelchair  to vehicle, while dad carried baby to car and placed in rear facing car seat. Stable at discharge.

## 2017-03-23 NOTE — DISCHARGE INSTRUCTIONS
Section: What to Expect at [address] [place] [address]    A  section, or , is surgery to deliver your baby through a cut, called an incision, that the doctor makes in your lower belly and uterus. You may have some pain in your lower belly and need pain medicine for 1 to 2 weeks. You can expect some vaginal bleeding for several weeks. You will probably need about 6 weeks to fully recover. It is important to take it easy while the incision is healing. Avoid heavy lifting, strenuous activities, or exercises that strain the belly muscles while you are recovering. Ask a family member or friend for help with housework, cooking, and shopping. This care sheet gives you a general idea about how long it will take for you to recover. But each person recovers at a different pace. Follow the steps below to get better as quickly as possible. How can you care for yourself at home? Activity  · Rest when you feel tired. Getting enough sleep will help you recover. · Try to walk each day. Start by walking a little more than you did the day before. Bit by bit, increase the amount you walk. Walking boosts blood flow and helps prevent pneumonia, constipation, and blood clots. · Avoid strenuous activities, such as bicycle riding, jogging, weightlifting, and aerobic exercise, for 6 weeks or until your doctor says it is okay. · Until your doctor says it is okay, do not lift anything heavier than your baby. · Do not do sit-ups or other exercises that strain the belly muscles for 6 weeks or until your doctor says it is okay. · Hold a pillow over your incision when you cough or take deep breaths. This will support your belly and decrease your pain. · You may shower as usual. Pat the incision dry when you are done. · You will have some vaginal bleeding. Wear sanitary pads. Do not douche or use tampons until your doctor says it is okay. · Ask your doctor when you can drive again.   · You will probably need to take at least 6 weeks off work. It depends on the type of work you do and how you feel. · Ask your doctor when it is okay for you to have sex. Diet  · You can eat your normal diet. If your stomach is upset, try bland, low-fat foods like plain rice, broiled chicken, toast, and yogurt. · Drink plenty of fluids (unless your doctor tells you not to). · You may notice that your bowel movements are not regular right after your surgery. This is common. Try to avoid constipation and straining with bowel movements. You may want to take a fiber supplement every day. If you have not had a bowel movement after a couple of days, ask your doctor about taking a mild laxative. · If you are breastfeeding, do not drink any alcohol. Medicines  · Your doctor will tell you if and when you can restart your medicines. He or she will also give you instructions about taking any new medicines. · If you take blood thinners, such as warfarin (Coumadin), clopidogrel (Plavix), or aspirin, be sure to talk to your doctor. He or she will tell you if and when to start taking those medicines again. Make sure that you understand exactly what your doctor wants you to do. · Take pain medicines exactly as directed. ¨ If the doctor gave you a prescription medicine for pain, take it as prescribed. ¨ If you are not taking a prescription pain medicine, ask your doctor if you can take an over-the-counter medicine. · If you think your pain medicine is making you sick to your stomach:  ¨ Take your medicine after meals (unless your doctor has told you not to). ¨ Ask your doctor for a different pain medicine. · If your doctor prescribed antibiotics, take them as directed. Do not stop taking them just because you feel better. You need to take the full course of antibiotics. Incision care  · If you have strips of tape on the incision, leave the tape on for a week or until it falls off. · Wash the area daily with warm, soapy water, and pat it dry. Don't use hydrogen peroxide or alcohol, which can slow healing. You may cover the area with a gauze bandage if it weeps or rubs against clothing. Change the bandage every day. · Keep the area clean and dry. Other instructions  · If you breastfeed your baby, you may be more comfortable while you are healing if you place the baby so that he or she is not resting on your belly. Try tucking your baby under your arm, with his or her body along the side you will be feeding on. Support your baby's upper body with your arm. With that hand you can control your baby's head to bring his or her mouth to your breast. This is sometimes called the football hold. Follow-up care is a key part of your treatment and safety. Be sure to make and go to all appointments, and call your doctor if you are having problems. It's also a good idea to know your test results and keep a list of the medicines you take. When should you call for help? Call 911 anytime you think you may need emergency care. For example, call if:  · You passed out (lost consciousness). · You have symptoms of a blood clot in your lung (called a pulmonary embolism). These may include:  ¨ Sudden chest pain. ¨ Trouble breathing. ¨ Coughing up blood. · You have thoughts of harming yourself, your baby, or another person. Call your doctor now or seek immediate medical care if:  · You have severe vaginal bleeding. This means that you are soaking through a pad every hour for 2 or more hours. · You are dizzy or lightheaded, or you feel like you may faint. · You have new or more belly pain. · You have loose stitches, or your incision comes open. · You have symptoms of infection, such as:  ¨ Increased pain, swelling, warmth, or redness. ¨ Red streaks leading from the incision. ¨ Pus draining from the incision. ¨ A fever.   · You have symptoms of a blood clot in your leg (called a deep vein thrombosis), such as:  ¨ Pain in your calf, back of the knee, thigh, or groin.  ¨ Redness and swelling in your leg or groin. Watch closely for changes in your health, and be sure to contact your doctor if:  · You feel sad, anxious, or hopeless for more than a few days. · You do not get better as expected. Where can you learn more? Go to http://orlin-vitaliy.info/. Enter M806 in the search box to learn more about \" Section: What to Expect at Home. \"  Current as of: May 30, 2016  Content Version: 11.1  © 5899-4455 [employer]. Care instructions adapted under license by [employer] (which disclaims liability or warranty for this information). If you have questions about a medical condition or this instruction, always ask your healthcare professional. [address] any warranty or liability for your use of this information.

## 2017-03-23 NOTE — PROGRESS NOTES
Assessment complete per Doc Flowsheet. WNL. Motrin and Percocet given po for pain 4/10 per patient request.  Scheduled Colace, Prenatal Vitamin and Chewable Mylicon given po.

## 2017-03-23 NOTE — DISCHARGE SUMMARY
Post-Operative Day Number 3 Progress/Discharge Note    Patient doing well post-op day 3 from  delivery without significant complaints. Pain controlled on current medication. Voiding without difficulty, normal lochia. Vitals:  Patient Vitals for the past 8 hrs:   BP Temp Pulse Resp   17 0740 141/75 97 °F (36.1 °C) 76 18   17 0403 133/64 - 85 -     Temp (24hrs), Av.8 °F (36.6 °C), Min:97 °F (36.1 °C), Max:98.3 °F (36.8 °C)      Vital signs stable, afebrile. Exam:  Patient without distress. Abdomen soft, fundus firm at level of umbilicus, non tender. Incision dry and                      clean without erythema. Lower extremities are negative for swelling, cords or tenderness. Lab/Data Review: All lab results for the last 24 hours reviewed. Assessment and Plan:  Patient appears to be having uncomplicated post- course. Continue routine post-op care and maternal education. Plan discharge for today with follow up in our office in 1-2 weeks.

## 2017-03-23 NOTE — LACTATION NOTE
Mom and baby are going home today. Continue to offer the breast without restriction. Mom's milk should be fully in over the next few days. Reviewed engorgement precautions. Hand Expression has been demoed and written hand-out reviewed. As milk comes in baby will be more alert at the breast and swallows will be more obvious. Breasts may feel softer once baby has finished nursing. Baby should be back to birth weight by 3weeks of age. And then gain on average 1 oz per day for the next 2-3 months. Reviewed babies should be exclusively breastfeeding for the first 6 months and that breastfeeding should continue after introduction of appropriate complimentary foods after 6 months. Initial output should be at least 1 wet and 1 bowel movement for each day old baby is. By day 5-7 once milk is fully in baby will consistently have 6 or more soaking wet diapers and about 4 bowel movement. Some babies have a bowel movement with every feeding and some have 1-3 large bowel movements each day. Inadequate output may indicate inadequate feedings and should be reported to your Pediatrician. Bowel habits may change as baby gets older. Encouraged follow-up at Pediatrician in 1-2 days, no later than 1 week of age. Call [facility] for any questions as needed or to set up an OP visit. OP phone calls are returned within 24 hours. Breastfeeding Support Group is offered here monthly. Community Breastfeeding Resource List given.

## 2017-03-24 ENCOUNTER — APPOINTMENT (OUTPATIENT)
Dept: ULTRASOUND IMAGING | Age: 37
End: 2017-03-24
Attending: EMERGENCY MEDICINE
Payer: COMMERCIAL

## 2017-03-24 ENCOUNTER — APPOINTMENT (OUTPATIENT)
Dept: GENERAL RADIOLOGY | Age: 37
End: 2017-03-24
Payer: COMMERCIAL

## 2017-03-24 ENCOUNTER — HOSPITAL ENCOUNTER (OUTPATIENT)
Age: 37
Setting detail: OBSERVATION
Discharge: HOME | End: 2017-03-26
Attending: EMERGENCY MEDICINE | Admitting: OBSTETRICS & GYNECOLOGY
Payer: COMMERCIAL

## 2017-03-24 DIAGNOSIS — R60.9 EDEMA, UNSPECIFIED TYPE: Primary | ICD-10-CM

## 2017-03-24 DIAGNOSIS — I15.9 SECONDARY HYPERTENSION: ICD-10-CM

## 2017-03-24 LAB
ALBUMIN SERPL BCP-MCNC: 2.7 G/DL (ref 3.5–5)
ALBUMIN/GLOB SERPL: 0.6 {RATIO} (ref 1.2–3.5)
ALP SERPL-CCNC: 128 U/L (ref 50–136)
ALT SERPL-CCNC: 59 U/L (ref 12–65)
ANION GAP BLD CALC-SCNC: 5 MMOL/L (ref 7–16)
AST SERPL W P-5'-P-CCNC: 53 U/L (ref 15–37)
BACTERIA URNS QL MICRO: 0 /HPF
BASOPHILS # BLD AUTO: 0 K/UL (ref 0–0.2)
BASOPHILS # BLD: 0 % (ref 0–2)
BILIRUB SERPL-MCNC: 0.2 MG/DL (ref 0.2–1.1)
BNP SERPL-MCNC: 165 PG/ML
BUN SERPL-MCNC: 9 MG/DL (ref 6–23)
CALCIUM SERPL-MCNC: 8.6 MG/DL (ref 8.3–10.4)
CASTS URNS QL MICRO: 0 /LPF
CHLORIDE SERPL-SCNC: 103 MMOL/L (ref 98–107)
CO2 SERPL-SCNC: 29 MMOL/L (ref 21–32)
CREAT SERPL-MCNC: 1 MG/DL (ref 0.6–1)
DIFFERENTIAL METHOD BLD: ABNORMAL
EOSINOPHIL # BLD: 0.2 K/UL (ref 0–0.8)
EOSINOPHIL NFR BLD: 2 % (ref 0.5–7.8)
EPI CELLS #/AREA URNS HPF: NORMAL /HPF
ERYTHROCYTE [DISTWIDTH] IN BLOOD BY AUTOMATED COUNT: 14.3 % (ref 11.9–14.6)
GLOBULIN SER CALC-MCNC: 4.3 G/DL (ref 2.3–3.5)
GLUCOSE SERPL-MCNC: 83 MG/DL (ref 65–100)
HCG UR QL: NEGATIVE
HCT VFR BLD AUTO: 30.2 % (ref 35.8–46.3)
HGB BLD-MCNC: 9.2 G/DL (ref 11.7–15.4)
IMM GRANULOCYTES # BLD: 0.1 K/UL (ref 0–0.5)
IMM GRANULOCYTES NFR BLD AUTO: 1.1 % (ref 0–5)
LYMPHOCYTES # BLD AUTO: 18 % (ref 13–44)
LYMPHOCYTES # BLD: 1.5 K/UL (ref 0.5–4.6)
MCH RBC QN AUTO: 26.4 PG (ref 26.1–32.9)
MCHC RBC AUTO-ENTMCNC: 30.5 G/DL (ref 31.4–35)
MCV RBC AUTO: 86.5 FL (ref 79.6–97.8)
MONOCYTES # BLD: 0.7 K/UL (ref 0.1–1.3)
MONOCYTES NFR BLD AUTO: 8 % (ref 4–12)
NEUTS SEG # BLD: 5.6 K/UL (ref 1.7–8.2)
NEUTS SEG NFR BLD AUTO: 71 % (ref 43–78)
PLATELET # BLD AUTO: 248 K/UL (ref 150–450)
PMV BLD AUTO: 9.3 FL (ref 10.8–14.1)
POTASSIUM SERPL-SCNC: 3.6 MMOL/L (ref 3.5–5.1)
PROT SERPL-MCNC: 7 G/DL (ref 6.3–8.2)
RBC # BLD AUTO: 3.49 M/UL (ref 4.05–5.25)
RBC #/AREA URNS HPF: NORMAL /HPF
SODIUM SERPL-SCNC: 137 MMOL/L (ref 136–145)
WBC # BLD AUTO: 8.1 K/UL (ref 4.3–11.1)
WBC URNS QL MICRO: NORMAL /HPF

## 2017-03-24 PROCEDURE — 77030032490 HC SLV COMPR SCD KNE COVD -B

## 2017-03-24 PROCEDURE — 99285 EMERGENCY DEPT VISIT HI MDM: CPT | Performed by: PHYSICIAN ASSISTANT

## 2017-03-24 PROCEDURE — 85025 COMPLETE CBC W/AUTO DIFF WBC: CPT | Performed by: PHYSICIAN ASSISTANT

## 2017-03-24 PROCEDURE — 93971 EXTREMITY STUDY: CPT

## 2017-03-24 PROCEDURE — 93005 ELECTROCARDIOGRAM TRACING: CPT | Performed by: PHYSICIAN ASSISTANT

## 2017-03-24 PROCEDURE — 81015 MICROSCOPIC EXAM OF URINE: CPT | Performed by: EMERGENCY MEDICINE

## 2017-03-24 PROCEDURE — 99218 HC RM OBSERVATION: CPT

## 2017-03-24 PROCEDURE — 81025 URINE PREGNANCY TEST: CPT

## 2017-03-24 PROCEDURE — 83880 ASSAY OF NATRIURETIC PEPTIDE: CPT | Performed by: PHYSICIAN ASSISTANT

## 2017-03-24 PROCEDURE — 80053 COMPREHEN METABOLIC PANEL: CPT | Performed by: PHYSICIAN ASSISTANT

## 2017-03-24 PROCEDURE — 74011250637 HC RX REV CODE- 250/637: Performed by: EMERGENCY MEDICINE

## 2017-03-24 PROCEDURE — 81003 URINALYSIS AUTO W/O SCOPE: CPT | Performed by: PHYSICIAN ASSISTANT

## 2017-03-24 PROCEDURE — 71020 XR CHEST PA LAT: CPT

## 2017-03-24 RX ORDER — OXYCODONE AND ACETAMINOPHEN 7.5; 325 MG/1; MG/1
1 TABLET ORAL
Status: DISCONTINUED | OUTPATIENT
Start: 2017-03-24 | End: 2017-03-26 | Stop reason: HOSPADM

## 2017-03-24 RX ORDER — HYDROCODONE BITARTRATE AND ACETAMINOPHEN 7.5; 325 MG/1; MG/1
1 TABLET ORAL ONCE
Status: COMPLETED | OUTPATIENT
Start: 2017-03-24 | End: 2017-03-24

## 2017-03-24 RX ORDER — FUROSEMIDE 20 MG/1
20 TABLET ORAL 2 TIMES DAILY
Status: DISCONTINUED | OUTPATIENT
Start: 2017-03-25 | End: 2017-03-26 | Stop reason: HOSPADM

## 2017-03-24 RX ORDER — LABETALOL 200 MG/1
200 TABLET, FILM COATED ORAL 3 TIMES DAILY
Status: DISCONTINUED | OUTPATIENT
Start: 2017-03-25 | End: 2017-03-26 | Stop reason: HOSPADM

## 2017-03-24 RX ADMIN — HYDROCODONE BITARTRATE AND ACETAMINOPHEN 1 TABLET: 7.5; 325 TABLET ORAL at 21:02

## 2017-03-24 NOTE — IP AVS SNAPSHOT
Current Discharge Medication List  
  
START taking these medications Dose & Instructions Dispensing Information Comments Morning Noon Evening Bedtime  
 ferrous sulfate 325 mg (65 mg iron) tablet Your last dose was: Your next dose is:    
   
   
 Dose:  325 mg Take 1 Tab by mouth Daily (before breakfast). Quantity:  30 Tab Refills:  0 CONTINUE these medications which have CHANGED Dose & Instructions Dispensing Information Comments Morning Noon Evening Bedtime  
 labetalol 200 mg tablet Commonly known as:  Jairo Cai What changed:   
- medication strength 
- how much to take - when to take this Your last dose was: Your next dose is:    
   
   
 Dose:  200 mg Take 1 Tab by mouth three (3) times daily. Quantity:  90 Tab Refills:  0 CONTINUE these medications which have NOT CHANGED Dose & Instructions Dispensing Information Comments Morning Noon Evening Bedtime  
 oxyCODONE-acetaminophen 7.5-325 mg per tablet Commonly known as:  PERCOCET 7.5 Your last dose was: Your next dose is:    
   
   
 Dose:  1-2 Tab Take 1-2 Tabs by mouth every four (4) hours as needed. Max Daily Amount: 12 Tabs. Quantity:  60 Tab Refills:  0 PRENATAL S 27-0.8 mg Tab tablet Generic drug:  prenatal vit-iron fumarate-fa Your last dose was: Your next dose is:    
   
   
 Dose:  1 Tab Take 1 Tab by mouth daily. Refills:  0 PriLOSEC 10 mg capsule Generic drug:  omeprazole Your last dose was: Your next dose is:    
   
   
 Dose:  10 mg Take 10 mg by mouth daily. Refills:  0 PROAIR HFA 90 mcg/actuation inhaler Generic drug:  albuterol Your last dose was: Your next dose is:    
   
   
 Dose:  2 Puff Take 2 Puffs by inhalation. Refills:  0 STOP taking these medications   
 ibuprofen 800 mg tablet Commonly known as:  MOTRIN ZyrTEC 10 mg tablet Generic drug:  cetirizine Where to Get Your Medications These medications were sent to [address], [address] [facility] AT [address] [id number] Hours:  24-hours Phone:  [phone]  
  ferrous sulfate 325 mg (65 mg iron) tablet  
 labetalol 200 mg tablet

## 2017-03-24 NOTE — IP AVS SNAPSHOT
Jemima Cleveland 
 
 
 [address] 
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  
    
 Sulfamethoxazole-Trimethoprim Anaphylaxis Other reaction(s): Hospitalized anaphylaxis Recent Documentation Height Weight Breastfeeding? BMI OB Status Smoking Status 1.651 m 107 kg Yes 39.26 kg/m2 Recent pregnancy Never Smoker Emergency Contacts Name Discharge Info Relation Home Work Mobile [address] Eugenia Maldonado 52 About your hospitalization You were admitted on:  2017 You last received care in the:  [address] You were discharged on:  2017 Unit phone number:  [phone] Why you were hospitalized Your primary diagnosis was:  Preeclampsia In Postpartum Period Providers Seen During Your Hospitalizations Provider Role Specialty Primary office phone Faustino Pedraza MD Attending Provider Emergency Medicine [phone] Malinda Vera DO Attending Provider Obstetrics & Gynecology [phone] Your Primary Care Physician (PCP) Primary Care Physician Office Phone Office Fax Domenic Humphrey [phone] Follow-up Information Follow up With Details Comments Contact Info Dangelo Barrios MD   [address] Dorsie Osler [phone] Your Appointments 2017  9:00 AM EDT PostPartum 2 week with José Amaya MD  
[address] [place] [address] [phone] Current Discharge Medication List  
  
 START taking these medications Dose & Instructions Dispensing Information Comments Morning Noon Evening Bedtime  
 ferrous sulfate 325 mg (65 mg iron) tablet Your last dose was: Your next dose is:    
   
   
 Dose:  325 mg Take 1 Tab by mouth Daily (before breakfast). Quantity:  30 Tab Refills:  0 CONTINUE these medications which have CHANGED Dose & Instructions Dispensing Information Comments Morning Noon Evening Bedtime  
 labetalol 200 mg tablet Commonly known as:  Guillermina Pale What changed:   
- medication strength 
- how much to take - when to take this Your last dose was: Your next dose is:    
   
   
 Dose:  200 mg Take 1 Tab by mouth three (3) times daily. Quantity:  90 Tab Refills:  0 CONTINUE these medications which have NOT CHANGED Dose & Instructions Dispensing Information Comments Morning Noon Evening Bedtime  
 oxyCODONE-acetaminophen 7.5-325 mg per tablet Commonly known as:  PERCOCET 7.5 Your last dose was: Your next dose is:    
   
   
 Dose:  1-2 Tab Take 1-2 Tabs by mouth every four (4) hours as needed. Max Daily Amount: 12 Tabs. Quantity:  60 Tab Refills:  0 PRENATAL S 27-0.8 mg Tab tablet Generic drug:  prenatal vit-iron fumarate-fa Your last dose was: Your next dose is:    
   
   
 Dose:  1 Tab Take 1 Tab by mouth daily. Refills:  0 PriLOSEC 10 mg capsule Generic drug:  omeprazole Your last dose was: Your next dose is:    
   
   
 Dose:  10 mg Take 10 mg by mouth daily. Refills:  0 PROAIR HFA 90 mcg/actuation inhaler Generic drug:  albuterol Your last dose was: Your next dose is:    
   
   
 Dose:  2 Puff Take 2 Puffs by inhalation. Refills:  0 STOP taking these medications ibuprofen 800 mg tablet Commonly known as:  MOTRIN ZyrTEC 10 mg tablet Generic drug:  cetirizine Where to Get Your Medications These medications were sent to [address], [address] AT [address] [address] Hours:  24-hours Phone:  [phone]  
  ferrous sulfate 325 mg (65 mg iron) tablet  
 labetalol 200 mg tablet Discharge Instructions DISCHARGE SUMMARY from Nurse The following personal items are in your possession at time of discharge: 
 
Dental Appliances: None Visual Aid: Glasses, With patient Home Medications: None Jewelry: Frank July Clothing: Andrew Winifred Other Valuables: Cell Phone, Weekdone PATIENT INSTRUCTIONS: 
 
After general anesthesia or intravenous sedation, for 24 hours or while taking prescription Narcotics: · Limit your activities · Do not drive and operate hazardous machinery · Do not make important personal or business decisions · Do  not drink alcoholic beverages · If you have not urinated within 8 hours after discharge, please contact your surgeon on call. Report the following to your surgeon: 
· Excessive pain, swelling, redness or odor of or around the surgical area · Temperature over 100.5 · Nausea and vomiting lasting longer than 4 hours or if unable to take medications · Any signs of decreased circulation or nerve impairment to extremity: change in color, persistent  numbness, tingling, coldness or increase pain · Any questions What to do at Home: 
Recommended activity: Activity as tolerated, and as told by your doctor. If you experience any of the following symptoms listed below, please follow up with your doctor. *  Please give a list of your current medications to your Primary Care Provider.  
 
*  Please update this list whenever your medications are discontinued, doses are 
 changed, or new medications (including over-the-counter products) are added. *  Please carry medication information at all times in case of emergency situations. These are general instructions for a healthy lifestyle: No smoking/ No tobacco products/ Avoid exposure to second hand smoke Surgeon General's Warning:  Quitting smoking now greatly reduces serious risk to your health. Obesity, smoking, and sedentary lifestyle greatly increases your risk for illness A healthy diet, regular physical exercise & weight monitoring are important for maintaining a healthy lifestyle You may be retaining fluid if you have a history of heart failure or if you experience any of the following symptoms:  Weight gain of 3 pounds or more overnight or 5 pounds in a week, increased swelling in our hands or feet or shortness of breath while lying flat in bed. Please call your doctor as soon as you notice any of these symptoms; do not wait until your next office visit. Recognize signs and symptoms of STROKE: 
 
F-face looks uneven A-arms unable to move or move unevenly S-speech slurred or non-existent T-time-call 911 as soon as signs and symptoms begin-DO NOT go Back to bed or wait to see if you get better-TIME IS BRAIN. Warning Signs of HEART ATTACK Call 911 if you have these symptoms: 
? Chest discomfort. Most heart attacks involve discomfort in the center of the chest that lasts more than a few minutes, or that goes away and comes back. It can feel like uncomfortable pressure, squeezing, fullness, or pain. ? Discomfort in other areas of the upper body. Symptoms can include pain or discomfort in one or both arms, the back, neck, jaw, or stomach. ? Shortness of breath with or without chest discomfort. ? Other signs may include breaking out in a cold sweat, nausea, or lightheadedness. Don't wait more than five minutes to call [address]!  Fast action can save your life. Calling 911 is almost always the fastest way to get lifesaving treatment. Emergency Medical Services staff can begin treatment when they arrive  up to an hour sooner than if someone gets to the hospital by car. The discharge information has been reviewed with the patient. The patient verbalized understanding. Discharge medications reviewed with the patient Learning About Preeclampsia After Childbirth What is preeclampsia? A woman with preeclampsia has blood pressure that is higher than usual. She may also have other serious symptoms. Preeclampsia can be dangerous. When it is severe, it can cause seizures (eclampsia) or liver or kidney damage. When the liver is affected, some women get HELLP syndrome, a blood-clotting and bleeding problem. HELLP can come on quickly and can be deadly. This is why your doctor checks you and your baby often. Preeclampsia usually occurs after 20 weeks of pregnancy. In rare cases, it is first noted right after childbirth. Most often, it starts near the end of pregnancy and goes away after childbirth. What are the symptoms? Mild preeclampsia usually doesn't cause symptoms. But preeclampsia can cause rapid weight gain and sudden swelling of the hands and face. Severe preeclampsia does cause symptoms. It can cause a very bad headache and trouble seeing and breathing. It also can cause belly pain. You may also urinate less than usual. 
If you have new preeclampsia symptoms after you go home from the hospital, call your doctor right away. What can you expect after you have had preeclampsia? In the hospital 
After the baby and the placenta are delivered, preeclampsia usually starts to improve. Most women get better in the first few days after childbirth. After having preeclampsia, you still have a risk of seizures for a day or more after childbirth.  (Very rarely, seizures happen later on.) So your doctor may have you take magnesium sulfate for a day or more to prevent seizures. You may also take medicine to lower your blood pressure. When you go home Your blood pressure will most likely return to normal a few days after delivery. Your doctor will want to check your blood pressure sometime in the first week after you leave the hospital. 
Some women still have high blood pressure 6 weeks after childbirth. But most return to normal levels over the long term. · Take and record your blood pressure at home if your doctor tells you to. ¨ Learn the importance of the two measures of blood pressure (such as 120 over 80, or 120/80). The first number is the systolic pressure. This is the force of blood on the artery walls as the heart pumps. The second number is the diastolic pressure. This is the force of blood on the artery walls between heartbeats, when the heart is at rest. You have a choice of monitors to use. § Manual monitor: You pump up the cuff and use a stethoscope to listen for your pulse. § Electronic monitor: The cuff inflates, and a gauge shows your pulse rate. ¨ To take your blood pressure: § Ask your doctor to check your blood pressure monitor to be sure that it is accurate and that the cuff fits you. Also ask your doctor to watch you use it, to make sure that you are using it right. § You should not eat, use tobacco products, or use medicine known to raise blood pressure (such as some nasal decongestant sprays) before you take your blood pressure. § Avoid taking your blood pressure if you have just exercised or are nervous or upset. Rest at least 15 minutes before you take your blood pressure. · Be safe with medicines. If you take medicine, take it exactly as prescribed. Call your doctor if you think you are having a problem with your medicine. · Do not smoke. Quitting smoking will help lower your blood pressure and improve your baby's growth and health.  If you need help quitting, talk to your doctor about stop-smoking programs and medicines. These can increase your chances of quitting for good. · Eat a balanced and healthy diet that has lots of fruits and vegetables. Long-term health After you have had preeclampsia, you have a higher-than-average risk of heart disease, stroke, and kidney disease. This may be because the same things that cause preeclampsia also cause heart and kidney disease. To protect your health, work with your doctor on living a heart-healthy lifestyle and getting the checkups you need. Your doctor may also want you to check your blood pressure at home. Follow-up care is a key part of your treatment and safety. Be sure to make and go to all appointments, and call your doctor if you are having problems. It's also a good idea to know your test results and keep a list of the medicines you take. Where can you learn more? Go to http://orlin-vitaliy.info/. Enter F046 in the search box to learn more about \"Learning About Preeclampsia After Childbirth. \" 
Current as of: May 30, 2016 Content Version: 11.1 © 7837-6080 [email or web address]. Care instructions adapted under license by [employer] (which disclaims liability or warranty for this information). If you have questions about a medical condition or this instruction, always ask your healthcare professional. [address] any warranty or liability for your use of this information. and appropriate educational materials and side effects teaching were provided. Discharge Orders Procedure Order Date Status Priority Quantity Spec Type Associated Dx CALL YOUR DOCTOR For: Other Call for fever >100.4, vaginal bleeding > 1 pad per hour, s&s of wound infection 03/26/17 1217 Normal Routine 1  Preeclampsia in postpartum period [[id number]] Comments:  Call for fever >100.4, vaginal bleeding > 1 pad per hour, s&s of wound infection Questions: For:  Other ACTIVITY AFTER DISCHARGE Patient should: Resume activity as tolerated. Pelvic Rest 03/26/17 1217 Normal Routine 1  Preeclampsia in postpartum period [[id number]] Comments:  Pelvic Rest  
  Questions: Patient should:  Resume activity as tolerated. DIET REGULAR 03/26/17 1217 Normal Routine 1  Preeclampsia in postpartum period [[id number]] Spectrum[employer] Announcement We are excited to announce that we are making your provider's discharge notes available to you in Spectrum5. You will see these notes when they are completed and signed by the physician that discharged you from your recent hospital stay. If you have any questions or concerns about any information you see in Spectrum5, please call the Health Information Department where you were seen or reach out to your Primary Care Provider for more information about your plan of care. Introducing [facility] SERVICES! Marley Connell introduces Spectrum5 patient portal. Now you can access parts of your medical record, email your doctor's office, and request medication refills online. 1. In your internet browser, go to [email or web address] 2. Click on the First Time User? Click Here link in the Sign In box. You will see the New Member Sign Up page. 3. Enter your Spectrum5 Access Code exactly as it appears below. You will not need to use this code after youve completed the sign-up process. If you do not sign up before the expiration date, you must request a new code. · Spectrum5 Access Code: [email or web address] Expires: 4/3/2017 10:12 AM 
 
4. Enter the last four digits of your Social Security Number (xxxx) and Date of Birth (mm/dd/yyyy) as indicated and click Submit. You will be taken to the next sign-up page. 5. Create a Spectrum5 ID. This will be your Spectrum5 login ID and cannot be changed, so think of one that is secure and easy to remember. 6. Create a Spectrum[employer] password. You can change your password at any time. 7. Enter your Password Reset Question and Answer. This can be used at a later time if you forget your password. 8. Enter your e-mail address. You will receive e-mail notification when new information is available in [address] 9. Click Sign Up. You can now view and download portions of your medical record. 10. Click the Download Summary menu link to download a portable copy of your medical information. If you have questions, please visit the Frequently Asked Questions section of the [employer] website. Remember, [employer] is NOT to be used for urgent needs. For medical emergencies, dial 911. Now available from your iPhone and Android! General Information Please provide this summary of care documentation to your next provider. Patient Signature:  ____________________________________________________________ Date:  ____________________________________________________________  
  
[facility] Provider Signature:  ____________________________________________________________ Date:  ____________________________________________________________

## 2017-03-24 NOTE — ED TRIAGE NOTES
Pt. C/o bilateral leg pain from edema. Pt. 4 days Post . Pt. Had hypertension during pregnancy and is currently 174/88 with bilateral leg and feet edema.

## 2017-03-24 NOTE — ED PROVIDER NOTES
HPI Comments: Patient is a 78-year-old female 5 days postpartum after  presenting with bilateral lower extremity edema. She also reports some left lower extremity leg pain. She's never had this problem before. Patient has a history of hypertension and was placed on labetalol. She reports compliance with this medication. No significant shortness of breath, cough or chest pain. Patient is a 29 y.o. female presenting with leg pain. The history is provided by the patient. No  was used. Leg Pain    Pertinent negatives include no back pain. Past Medical History:   Diagnosis Date    Anxiety     Asthma     Chronic hypertension 2016    Collar bone fracture     Depression     Migraines     OTC meds only    PIH (pregnancy induced hypertension)     Was taking Propranolol    Sciatica 2013    Supervision of elderly multigravida (>=28years old at time of delivery) 2016    Supervision of high-risk pregnancy 2016    Varicella        Past Surgical History:   Procedure Laterality Date     DELIVERY ONLY  04/03/2008    x2    HX OTHER SURGICAL  2001    Bone Marrow BX    HX WISDOM TEETH EXTRACTION           Family History:   Problem Relation Age of Onset    Arthritis-osteo Mother     Other Mother      Fibromyalgia    Elevated Lipids Father     Hypertension Father     Cancer Father      PROSTATE    Arthritis-osteo Maternal Aunt     Asthma Maternal Grandmother     Arthritis-osteo Maternal Grandmother     Cancer Maternal Grandmother      MELANOMA    Breast Cancer Paternal Grandmother     Hypertension Paternal Grandmother        Social History     Social History    Marital status:      Spouse name: N/A    Number of children: N/A    Years of education: N/A     Occupational History    Not on file.      Social History Main Topics    Smoking status: Never Smoker    Smokeless tobacco: Never Used    Alcohol use No    Drug use: No    Sexual activity: Yes     Partners: Male     Birth control/ protection: None     Other Topics Concern    Not on file     Social History Narrative         ALLERGIES: Sulfa (sulfonamide antibiotics) and Sulfamethoxazole-trimethoprim    Review of Systems   Constitutional: Negative for fatigue and fever. HENT: Negative for sore throat. Eyes: Negative for pain. Respiratory: Negative for cough, chest tightness and shortness of breath. Cardiovascular: Positive for leg swelling. Gastrointestinal: Negative for abdominal pain. Genitourinary: Negative for dysuria. Musculoskeletal: Negative for back pain. Neurological: Negative for syncope and headaches. Vitals:    03/24/17 1829 03/24/17 1926   BP: 174/88    Pulse: 99    Resp: 21    Temp: 98.5 °F (36.9 °C)    SpO2: 99% 98%   Weight: 107.5 kg (237 lb)    Height: 5' 5\" (1.651 m)             Physical Exam   Constitutional: She is oriented to person, place, and time. She appears well-developed and well-nourished. No distress. HENT:   Head: Normocephalic and atraumatic. Eyes: Conjunctivae and EOM are normal. Pupils are equal, round, and reactive to light. Neck: Normal range of motion. Neck supple. Cardiovascular: Normal rate, regular rhythm and normal heart sounds. Pulmonary/Chest: Effort normal and breath sounds normal. No respiratory distress. She has no wheezes. She has no rales. Abdominal: Soft. She exhibits no distension. There is no tenderness. There is no rebound. Musculoskeletal: Normal range of motion. She exhibits edema. She exhibits no tenderness. +2 bilateral pitting edema   Neurological: She is alert and oriented to person, place, and time. Skin: Skin is warm and dry. No rash noted. She is not diaphoretic. Psychiatric: She has a normal mood and affect. Her behavior is normal.   Vitals reviewed.        MDM  Number of Diagnoses or Management Options  Edema, unspecified type: new and requires workup  Secondary hypertension: new and requires workup  Diagnosis management comments: Patient has evidence of peripheral edema and possibly congestive heart failure with a slightly elevated BNP. Given that she is recently postpartum feel admission would be prudent to rule out postpartum cardiomyopathy and obtain better blood pressure control. Admitted to the [facility] service in stable condition.        Amount and/or Complexity of Data Reviewed  Clinical lab tests: ordered and reviewed (Results for orders placed or performed during the hospital encounter of 03/24/17  -CBC WITH AUTOMATED DIFF       Result                                            Value                         Ref Range                       WBC                                               8.1                           4.3 - 11.1 K/uL                 RBC                                               3.49 (L)                      4.05 - 5.25 M/uL                HGB                                               9.2 (L)                       11.7 - 15.4 g/dL                HCT                                               30.2 (L)                      35.8 - 46.3 %                   MCV                                               86.5                          79.6 - 97.8 FL                  MCH                                               26.4                          26.1 - 32.9 PG                  MCHC                                              30.5 (L)                      31.4 - 35.0 g/dL                RDW                                               14.3                          11.9 - 14.6 %                   PLATELET                                          248                           150 - 450 K/uL                  MPV                                               9.3 (L)                       10.8 - 14.1 FL                  DF                                                AUTOMATED                                                     NEUTROPHILS 71                            43 - 78 %                       LYMPHOCYTES                                       18                            13 - 44 %                       MONOCYTES                                         8                             4.0 - 12.0 %                    EOSINOPHILS                                       2                             0.5 - 7.8 %                     BASOPHILS                                         0                             0.0 - 2.0 %                     IMMATURE GRANULOCYTES                             1.1                           0.0 - 5.0 %                     ABS. NEUTROPHILS                                  5.6                           1.7 - 8.2 K/UL                  ABS. LYMPHOCYTES                                  1.5                           0.5 - 4.6 K/UL                  ABS. MONOCYTES                                    0.7                           0.1 - 1.3 K/UL                  ABS. EOSINOPHILS                                  0.2                           0.0 - 0.8 K/UL                  ABS. BASOPHILS                                    0.0                           0.0 - 0.2 K/UL                  ABS. IMM.  GRANS.                                  0.1                           0.0 - 0.5 K/UL             -METABOLIC PANEL, COMPREHENSIVE       Result                                            Value                         Ref Range                       Sodium                                            137                           136 - 145 mmol/L                Potassium                                         3.6                           3.5 - 5.1 mmol/L                Chloride                                          103                           98 - 107 mmol/L                 CO2                                               29                            21 - 32 mmol/L                  Anion gap                                         5 (L)                         7 - 16 mmol/L                   Glucose                                           83                            65 - 100 mg/dL                  BUN                                               9                             6 - 23 MG/DL                    Creatinine                                        1.00                          0.6 - 1.0 MG/DL                 GFR est AA                                        >60                           >60 ml/min/1.73m2               GFR est non-AA                                    >60                           >60 ml/min/1.73m2               Calcium                                           8.6                           8.3 - 10.4 MG/DL                Bilirubin, total                                  0.2                           0.2 - 1.1 MG/DL                 ALT (SGPT)                                        59                            12 - 65 U/L                     AST (SGOT)                                        53 (H)                        15 - 37 U/L                     Alk. phosphatase                                  128                           50 - 136 U/L                    Protein, total                                    7.0                           6.3 - 8.2 g/dL                  Albumin                                           2.7 (L)                       3.5 - 5.0 g/dL                  Globulin                                          4.3 (H)                       2.3 - 3.5 g/dL                  A-G Ratio                                         0.6 (L)                       1.2 - 3.5                  -BNP       Result                                            Value                         Ref Range                       BNP                                               165                           pg/mL                      -URINE MICROSCOPIC       Result                                            Value Ref Range                       WBC                                               5-10                          0 /hpf                          RBC                                               20-50                         0 /hpf                          Epithelial cells                                  0-3                           0 /hpf                          Bacteria                                          0                             0 /hpf                          Casts                                             0                             0 /lpf                     -HCG URINE, QL. - POC       Result                                            Value                         Ref Range                       Pregnancy test,urine (POC)                        NEGATIVE                      NEG                        )  Tests in the radiology section of CPT®: ordered and reviewed (Xr Chest Pa Lat    Result Date: 3/24/2017  PA and lateral chest radiographs History: post- partem edema, 36 years Female Post-partum lower extremity edema  five days ago No chest complaints Comparison: None available Findings:  Normal cardiomediastinal silhouette. There is mild diffuse interstitial prominence, nonspecific, however in the appropriate clinical setting acute interstitial edema could be considered. Minimal dependent subsegmental atelectasis bilateral lung bases. No evidence of pneumothorax, pleural effusion, or dense air space opacity. Visualized soft tissue and osseous structures otherwise unremarkable. Impression:  In the appropriate clinical setting, findings could represent mild interstitial edema. Duplex Lower Ext Venous Left    Result Date: 3/24/2017  Examination:  Grayscale and color doppler ultrasound of left lower extremity. History: Left lower extremity swelling. Comparison: None available Findings:  The left common femoral, femoral, popliteal, peroneal, and posterior tibial veins demonstrate normal compressibility and color-flow. No evidence of deep venous thrombosis.  No abnormal fluid collection or Baker's cyst.     Impression: No evidence of left lower extremity DVT.    )  Review and summarize past medical records: yes  Discuss the patient with other providers: yes  Independent visualization of images, tracings, or specimens: yes    Risk of Complications, Morbidity, and/or Mortality  Presenting problems: high  Diagnostic procedures: moderate  Management options: moderate    Patient Progress  Patient progress: stable    ED Course       Procedures

## 2017-03-24 NOTE — Clinical Note
Patient Class[de-identified] Outpatient [102] Type of Bed: Medical [8] Reason for Observation: 24 hour obs gestioninal htn Admitting Physician: Clay HUA [[id number]] Attending Physician: Clay HUA [id number] Diagnosis: gestational htn

## 2017-03-25 LAB
ANION GAP BLD CALC-SCNC: 7 MMOL/L (ref 7–16)
BUN SERPL-MCNC: 12 MG/DL (ref 6–23)
CALCIUM SERPL-MCNC: 8.3 MG/DL (ref 8.3–10.4)
CHLORIDE SERPL-SCNC: 99 MMOL/L (ref 98–107)
CO2 SERPL-SCNC: 27 MMOL/L (ref 21–32)
CREAT SERPL-MCNC: 0.81 MG/DL (ref 0.6–1)
GLUCOSE SERPL-MCNC: 79 MG/DL (ref 65–100)
POTASSIUM SERPL-SCNC: 3.6 MMOL/L (ref 3.5–5.1)
SODIUM SERPL-SCNC: 133 MMOL/L (ref 136–145)

## 2017-03-25 PROCEDURE — 36415 COLL VENOUS BLD VENIPUNCTURE: CPT | Performed by: OBSTETRICS & GYNECOLOGY

## 2017-03-25 PROCEDURE — 99218 HC RM OBSERVATION: CPT

## 2017-03-25 PROCEDURE — 93306 TTE W/DOPPLER COMPLETE: CPT

## 2017-03-25 PROCEDURE — 74011250636 HC RX REV CODE- 250/636: Performed by: OBSTETRICS & GYNECOLOGY

## 2017-03-25 PROCEDURE — 96375 TX/PRO/DX INJ NEW DRUG ADDON: CPT

## 2017-03-25 PROCEDURE — 74011250637 HC RX REV CODE- 250/637: Performed by: OBSTETRICS & GYNECOLOGY

## 2017-03-25 PROCEDURE — 96365 THER/PROPH/DIAG IV INF INIT: CPT

## 2017-03-25 PROCEDURE — 80048 BASIC METABOLIC PNL TOTAL CA: CPT | Performed by: OBSTETRICS & GYNECOLOGY

## 2017-03-25 RX ORDER — LORAZEPAM 2 MG/ML
1 INJECTION INTRAMUSCULAR ONCE
Status: DISPENSED | OUTPATIENT
Start: 2017-03-25 | End: 2017-03-26

## 2017-03-25 RX ORDER — HYDROMORPHONE HYDROCHLORIDE 1 MG/ML
1 INJECTION, SOLUTION INTRAMUSCULAR; INTRAVENOUS; SUBCUTANEOUS ONCE
Status: COMPLETED | OUTPATIENT
Start: 2017-03-25 | End: 2017-03-25

## 2017-03-25 RX ORDER — MAGNESIUM SULFATE HEPTAHYDRATE 40 MG/ML
4 INJECTION, SOLUTION INTRAVENOUS ONCE
Status: COMPLETED | OUTPATIENT
Start: 2017-03-25 | End: 2017-03-25

## 2017-03-25 RX ORDER — DOCUSATE SODIUM 100 MG/1
100 CAPSULE, LIQUID FILLED ORAL 2 TIMES DAILY
Status: DISCONTINUED | OUTPATIENT
Start: 2017-03-25 | End: 2017-03-26 | Stop reason: HOSPADM

## 2017-03-25 RX ADMIN — FUROSEMIDE 20 MG: 20 TABLET ORAL at 22:01

## 2017-03-25 RX ADMIN — FUROSEMIDE 20 MG: 20 TABLET ORAL at 08:53

## 2017-03-25 RX ADMIN — OXYCODONE HYDROCHLORIDE AND ACETAMINOPHEN 1 TABLET: 7.5; 325 TABLET ORAL at 00:40

## 2017-03-25 RX ADMIN — DOCUSATE SODIUM 100 MG: 100 CAPSULE, LIQUID FILLED ORAL at 11:12

## 2017-03-25 RX ADMIN — OXYCODONE HYDROCHLORIDE AND ACETAMINOPHEN 1 TABLET: 7.5; 325 TABLET ORAL at 07:06

## 2017-03-25 RX ADMIN — OXYCODONE HYDROCHLORIDE AND ACETAMINOPHEN 1 TABLET: 7.5; 325 TABLET ORAL at 22:01

## 2017-03-25 RX ADMIN — LABETALOL HYDROCHLORIDE 200 MG: 200 TABLET, FILM COATED ORAL at 00:40

## 2017-03-25 RX ADMIN — DOCUSATE SODIUM 100 MG: 100 CAPSULE, LIQUID FILLED ORAL at 18:02

## 2017-03-25 RX ADMIN — LABETALOL HYDROCHLORIDE 200 MG: 200 TABLET, FILM COATED ORAL at 22:01

## 2017-03-25 RX ADMIN — FUROSEMIDE 20 MG: 20 TABLET ORAL at 00:39

## 2017-03-25 RX ADMIN — HYDROMORPHONE HYDROCHLORIDE 1 MG: 1 INJECTION, SOLUTION INTRAMUSCULAR; INTRAVENOUS; SUBCUTANEOUS at 11:12

## 2017-03-25 RX ADMIN — OXYCODONE HYDROCHLORIDE AND ACETAMINOPHEN 1 TABLET: 7.5; 325 TABLET ORAL at 16:40

## 2017-03-25 RX ADMIN — LABETALOL HYDROCHLORIDE 200 MG: 200 TABLET, FILM COATED ORAL at 08:53

## 2017-03-25 RX ADMIN — MAGNESIUM SULFATE HEPTAHYDRATE 4 G: 40 INJECTION, SOLUTION INTRAVENOUS at 11:12

## 2017-03-25 RX ADMIN — LABETALOL HYDROCHLORIDE 200 MG: 200 TABLET, FILM COATED ORAL at 18:02

## 2017-03-25 NOTE — ED NOTES
TRANSFER - OUT REPORT:    Verbal report given to [facility] on aKtie Arriaga  being transferred to  [id number] for routine progression of care       Report consisted of patients Situation, Background, Assessment and   Recommendations(SBAR). Information from the following report(s) ED Summary was reviewed with the receiving nurse. Lines:   Peripheral IV 03/24/17 Right Antecubital (Active)   Site Assessment Clean, dry, & intact 3/24/2017  6:58 PM   Phlebitis Assessment 0 3/24/2017  6:58 PM   Infiltration Assessment 0 3/24/2017  6:58 PM   Dressing Status Clean, dry, & intact 3/24/2017  6:58 PM        Opportunity for questions and clarification was provided.       Patient transported with:   [facility]

## 2017-03-25 NOTE — H&P
History & Physical    Name: Priscila Sullivan MRN: [id number]  SSN: [id number]    YOB: 1980  Age: 29 y.o. Sex: female      Subjective:     Reason for Admission:  Pregnancy and Preeclampsia    History of Present Illness: Ms. Lance Schneider is a 29 y.o.  female 5 days pp presented to ER with complaint of swelling. She was discharged home on Thursday with labetalol 100mg bid. Her blood pressure was elevated in ED. Chest xray potentially suspicious for pulmonary edema. Pt reports she does have a headache that she rates a \"7\" on scale of 1-10. She thinks it could be from sleep deprivation as only slept 1.5 hours yesterday. She is breastfeeding. No ruq pain or nausea. Post op pain is controlled with po percocet. OB History    Para Term  AB SAB TAB Ectopic Multiple Living   3 3 3 0 0 0 0 0 0 3      # Outcome Date GA Lbr Honorio/2nd Weight Sex Delivery Anes PTL Lv   3 Term 17 38w0d  7 lb 2.3 oz (3.24 kg) M CS-LTranv SPINAL AN N Y   2 Term 10/10/11 39w1d  8 lb 3.2 oz (3.72 kg) F  LO SPINAL AN N Y   1 Term 08   7 lb 2 oz (3.232 kg) F    Y        Past Medical History:   Diagnosis Date    Anxiety     Asthma     Chronic hypertension 2016    Collar bone fracture     Depression     Migraines     OTC meds only    PIH (pregnancy induced hypertension)     Was taking Propranolol    Preeclampsia in postpartum period 3/25/2017    Sciatica 2013    Supervision of elderly multigravida (>=28years old at time of delivery) 2016    Supervision of high-risk pregnancy 2016    Varicella      Past Surgical History:   Procedure Laterality Date     DELIVERY ONLY  04/03/2008    x2    HX OTHER SURGICAL      Bone Marrow BX    HX WISDOM TEETH EXTRACTION       Social History     Occupational History    Not on file.      Social History Main Topics    Smoking status: Never Smoker    Smokeless tobacco: Never Used    Alcohol use No    Drug use: No    Sexual activity: Yes     Partners: Male     Birth control/ protection: None      Family History   Problem Relation Age of Onset    Arthritis-osteo Mother     Other Mother      Fibromyalgia    Elevated Lipids Father     Hypertension Father     Cancer Father      PROSTATE    Arthritis-osteo Maternal Aunt     Asthma Maternal Grandmother     Arthritis-osteo Maternal Grandmother     Cancer Maternal Grandmother      MELANOMA    Breast Cancer Paternal Grandmother     Hypertension Paternal Grandmother        Allergies   Allergen Reactions    Sulfa (Sulfonamide Antibiotics) Other (comments) and Anaphylaxis     Immune system shuts down, S/S similiar to leukemia, states she will die if she has it again    Sulfamethoxazole-Trimethoprim Anaphylaxis     Other reaction(s): Hospitalized anaphylaxis                                      Prior to Admission medications    Medication Sig Start Date End Date Taking? Authorizing Provider   labetalol (NORMODYNE) 100 mg tablet Take 1 Tab by mouth two (2) times a day. Indications: hypertension 3/23/17   Marleen Marquis MD   ibuprofen (MOTRIN) 800 mg tablet Take 1 Tab by mouth every eight (8) hours as needed. 3/23/17   Marleen Marquis MD   oxyCODONE-acetaminophen (PERCOCET 7.5) 7.5-325 mg per tablet Take 1-2 Tabs by mouth every four (4) hours as needed. Max Daily Amount: 12 Tabs. 3/23/17   Marleen Marquis MD   albuterol [facility] HFA) 90 mcg/actuation inhaler Take 2 Puffs by inhalation. 11/29/16 11/29/17  Historical Provider   omeprazole (PRILOSEC) 10 mg capsule Take 10 mg by mouth daily. Historical Provider   prenatal vit-iron fumarate-fa (PRENATAL S) 27-0.8 mg Tab tablet Take 1 Tab by mouth daily. Historical Provider   cetirizine (ZYRTEC) 10 mg tablet Take  by mouth two (2) times daily as needed.     Historical Provider        Review of Systems:  A comprehensive review of systems was negative except for that written in the History of Present Illness. Objective:     Vitals:    Vitals:    17 2312 17 0039 17 0332 17 0818   BP: (!) 183/101 (!) 144/92 145/81 (!) 176/92   Pulse: 89 65 80 97   Resp: 16  16 16   Temp: 97.6 °F (36.4 °C)  97.6 °F (36.4 °C) 98.2 °F (36.8 °C)   SpO2: 99%  96% 98%   Weight:       Height:          Temp (24hrs), Av °F (36.7 °C), Min:97.6 °F (36.4 °C), Max:98.5 °F (36.9 °C)    BP  Min: 143/79  Max: 183/101     Physical Exam:  Patient without distress. Heart: Regular rate and rhythm or S1S2 present  Lung: clear to auscultation throughout lung fields, no wheezes, no rales, no rhonchi and normal respiratory effort  Abdomen: soft, nontender - pfannenstiel incision overall intact. Lower Extremities:  - Edema 2+    / reflexes - +2  / +3. No clonus. Lab/Data Review:  Recent Results (from the past 24 hour(s))   URINE MICROSCOPIC    Collection Time: 17  6:45 PM   Result Value Ref Range    WBC 5-10 0 /hpf    RBC 20-50 0 /hpf    Epithelial cells 0-3 0 /hpf    Bacteria 0 0 /hpf    Casts 0 0 /lpf   HCG URINE, QL. - POC    Collection Time: 17  6:52 PM   Result Value Ref Range    Pregnancy test,urine (POC) NEGATIVE  NEG     CBC WITH AUTOMATED DIFF    Collection Time: 17  7:00 PM   Result Value Ref Range    WBC 8.1 4.3 - 11.1 K/uL    RBC 3.49 (L) 4.05 - 5.25 M/uL    HGB 9.2 (L) 11.7 - 15.4 g/dL    HCT 30.2 (L) 35.8 - 46.3 %    MCV 86.5 79.6 - 97.8 FL    MCH 26.4 26.1 - 32.9 PG    MCHC 30.5 (L) 31.4 - 35.0 g/dL    RDW 14.3 11.9 - 14.6 %    PLATELET 989 746 - 729 K/uL    MPV 9.3 (L) 10.8 - 14.1 FL    DF AUTOMATED      NEUTROPHILS 71 43 - 78 %    LYMPHOCYTES 18 13 - 44 %    MONOCYTES 8 4.0 - 12.0 %    EOSINOPHILS 2 0.5 - 7.8 %    BASOPHILS 0 0.0 - 2.0 %    IMMATURE GRANULOCYTES 1.1 0.0 - 5.0 %    ABS. NEUTROPHILS 5.6 1.7 - 8.2 K/UL    ABS. LYMPHOCYTES 1.5 0.5 - 4.6 K/UL    ABS. MONOCYTES 0.7 0.1 - 1.3 K/UL    ABS. EOSINOPHILS 0.2 0.0 - 0.8 K/UL    ABS. BASOPHILS 0.0 0.0 - 0.2 K/UL    ABS. IMM. GRANS. 0.1 0.0 - 0.5 K/UL   METABOLIC PANEL, COMPREHENSIVE    Collection Time: 03/24/17  7:00 PM   Result Value Ref Range    Sodium 137 136 - 145 mmol/L    Potassium 3.6 3.5 - 5.1 mmol/L    Chloride 103 98 - 107 mmol/L    CO2 29 21 - 32 mmol/L    Anion gap 5 (L) 7 - 16 mmol/L    Glucose 83 65 - 100 mg/dL    BUN 9 6 - 23 MG/DL    Creatinine 1.00 0.6 - 1.0 MG/DL    GFR est AA >60 >60 ml/min/1.73m2    GFR est non-AA >60 >60 ml/min/1.73m2    Calcium 8.6 8.3 - 10.4 MG/DL    Bilirubin, total 0.2 0.2 - 1.1 MG/DL    ALT (SGPT) 59 12 - 65 U/L    AST (SGOT) 53 (H) 15 - 37 U/L    Alk. phosphatase 128 50 - 136 U/L    Protein, total 7.0 6.3 - 8.2 g/dL    Albumin 2.7 (L) 3.5 - 5.0 g/dL    Globulin 4.3 (H) 2.3 - 3.5 g/dL    A-G Ratio 0.6 (L) 1.2 - 3.5     BNP    Collection Time: 03/24/17  7:00 PM   Result Value Ref Range     pg/mL   METABOLIC PANEL, BASIC    Collection Time: 03/25/17  6:08 AM   Result Value Ref Range    Sodium 133 (L) 136 - 145 mmol/L    Potassium 3.6 3.5 - 5.1 mmol/L    Chloride 99 98 - 107 mmol/L    CO2 27 21 - 32 mmol/L    Anion gap 7 7 - 16 mmol/L    Glucose 79 65 - 100 mg/dL    BUN 12 6 - 23 MG/DL    Creatinine 0.81 0.6 - 1.0 MG/DL    GFR est AA >60 >60 ml/min/1.73m2    GFR est non-AA >60 >60 ml/min/1.73m2    Calcium 8.3 8.3 - 10.4 MG/DL       Assessment and Plan:     Principal Problem:    Preeclampsia in postpartum period (3/25/2017)       Spoke with MFM - on labetolol 200mg tid, lasix 20mg po bid. Will continue blood pressure meds. Dr. Raffi Moctezuma thought pt should receive magnesium therapy for 1 day with pp preeclampsia likely - creatinine initially elevated and 1 lft. Will monitor ins and outs. Echo today.      Signed By:  Lisa Otto DO     March 25, 2017

## 2017-03-25 NOTE — PROGRESS NOTES
TRANSFER - IN REPORT:    Verbal report received from [facility]) on [employer]  being received from ED(unit) for routine progression of care      Report consisted of patients Situation, Background, Assessment and   Recommendations(SBAR). Information from the following report(s) SBAR, Kardex, ED Summary, Intake/Output, MAR, Accordion and Med Rec Status was reviewed with the receiving nurse. Opportunity for questions and clarification was provided. Assessment completed upon patients arrival to unit and care assumed.

## 2017-03-25 NOTE — PROGRESS NOTES
Admission assessment complete via doc flow sheet. Patient is alert and oriented x 4. Respirations even and unlabored on room air. Lung sounds CTA bilaterally. Heart sounds S1, S2 auscultated and regular. Abdomen semi soft and tender. Surgical incision to lower abdomen with derma bond which is dry and intact. Bowel sounds active to all 4 quadrants. Patient reported a small BM this morning. IV flushed without difficulty. Patient ambulates ad tawnya to bathroom. Patient c/o pain to breasts, legs and  feet. Edema is noted to BLE. Bed is locked and in low position. Bed rails x 3. Patient is encouraged to call for assistance. Call light within reach.

## 2017-03-25 NOTE — PROGRESS NOTES
Pt assessment completed. Alert and oriented. Lungs CTA even and unlabored. Heart sounds regular. Abdomen soft and non tender with active bowel sounds. Low transverse incision noted on abdomen with derma bond from recent , clean dry and intact. IV present and infusing without difficulty. Pt denies pain needs at this time. All needs met will continue to monitor.

## 2017-03-26 VITALS
OXYGEN SATURATION: 96 % | HEIGHT: 65 IN | RESPIRATION RATE: 16 BRPM | BODY MASS INDEX: 39.3 KG/M2 | HEART RATE: 75 BPM | TEMPERATURE: 97.7 F | WEIGHT: 235.9 LBS | SYSTOLIC BLOOD PRESSURE: 125 MMHG | DIASTOLIC BLOOD PRESSURE: 64 MMHG

## 2017-03-26 LAB
ALBUMIN SERPL BCP-MCNC: 2.7 G/DL (ref 3.5–5)
ALBUMIN/GLOB SERPL: 0.6 {RATIO} (ref 1.2–3.5)
ALP SERPL-CCNC: 122 U/L (ref 50–136)
ALT SERPL-CCNC: 50 U/L (ref 12–65)
ANION GAP BLD CALC-SCNC: 10 MMOL/L (ref 7–16)
AST SERPL W P-5'-P-CCNC: 34 U/L (ref 15–37)
ATRIAL RATE: 86 BPM
BILIRUB SERPL-MCNC: 0.2 MG/DL (ref 0.2–1.1)
BUN SERPL-MCNC: 13 MG/DL (ref 6–23)
CALCIUM SERPL-MCNC: 8.4 MG/DL (ref 8.3–10.4)
CALCULATED P AXIS, ECG09: 53 DEGREES
CALCULATED R AXIS, ECG10: 61 DEGREES
CALCULATED T AXIS, ECG11: 40 DEGREES
CHLORIDE SERPL-SCNC: 103 MMOL/L (ref 98–107)
CO2 SERPL-SCNC: 27 MMOL/L (ref 21–32)
CREAT SERPL-MCNC: 0.83 MG/DL (ref 0.6–1)
DIAGNOSIS, 93000: NORMAL
ERYTHROCYTE [DISTWIDTH] IN BLOOD BY AUTOMATED COUNT: 15 % (ref 11.9–14.6)
GLOBULIN SER CALC-MCNC: 4.3 G/DL (ref 2.3–3.5)
GLUCOSE SERPL-MCNC: 84 MG/DL (ref 65–100)
HCT VFR BLD AUTO: 31.7 % (ref 35.8–46.3)
HGB BLD-MCNC: 9.5 G/DL (ref 11.7–15.4)
MCH RBC QN AUTO: 26 PG (ref 26.1–32.9)
MCHC RBC AUTO-ENTMCNC: 30 G/DL (ref 31.4–35)
MCV RBC AUTO: 86.8 FL (ref 79.6–97.8)
P-R INTERVAL, ECG05: 154 MS
PLATELET # BLD AUTO: 243 K/UL (ref 150–450)
PMV BLD AUTO: 9.3 FL (ref 10.8–14.1)
POTASSIUM SERPL-SCNC: 3.8 MMOL/L (ref 3.5–5.1)
PROT SERPL-MCNC: 7 G/DL (ref 6.3–8.2)
Q-T INTERVAL, ECG07: 352 MS
QRS DURATION, ECG06: 84 MS
RBC # BLD AUTO: 3.65 M/UL (ref 4.05–5.25)
SODIUM SERPL-SCNC: 140 MMOL/L (ref 136–145)
WBC # BLD AUTO: 7.5 K/UL (ref 4.3–11.1)

## 2017-03-26 PROCEDURE — 36415 COLL VENOUS BLD VENIPUNCTURE: CPT | Performed by: OBSTETRICS & GYNECOLOGY

## 2017-03-26 PROCEDURE — 74011250637 HC RX REV CODE- 250/637: Performed by: OBSTETRICS & GYNECOLOGY

## 2017-03-26 PROCEDURE — 80053 COMPREHEN METABOLIC PANEL: CPT | Performed by: OBSTETRICS & GYNECOLOGY

## 2017-03-26 PROCEDURE — 99218 HC RM OBSERVATION: CPT

## 2017-03-26 PROCEDURE — 96366 THER/PROPH/DIAG IV INF ADDON: CPT

## 2017-03-26 PROCEDURE — 74011250636 HC RX REV CODE- 250/636: Performed by: OBSTETRICS & GYNECOLOGY

## 2017-03-26 PROCEDURE — 85027 COMPLETE CBC AUTOMATED: CPT | Performed by: OBSTETRICS & GYNECOLOGY

## 2017-03-26 RX ORDER — LABETALOL 200 MG/1
200 TABLET, FILM COATED ORAL 3 TIMES DAILY
Qty: 90 TAB | Refills: 0 | Status: SHIPPED | OUTPATIENT
Start: 2017-03-26 | End: 2018-01-04 | Stop reason: SDUPTHER

## 2017-03-26 RX ORDER — PANTOPRAZOLE SODIUM 40 MG/1
40 TABLET, DELAYED RELEASE ORAL
Status: DISCONTINUED | OUTPATIENT
Start: 2017-03-26 | End: 2017-03-26 | Stop reason: HOSPADM

## 2017-03-26 RX ORDER — MAGNESIUM SULFATE HEPTAHYDRATE 40 MG/ML
2 INJECTION, SOLUTION INTRAVENOUS CONTINUOUS
Status: DISCONTINUED | OUTPATIENT
Start: 2017-03-26 | End: 2017-03-26 | Stop reason: HOSPADM

## 2017-03-26 RX ORDER — LANOLIN ALCOHOL/MO/W.PET/CERES
325 CREAM (GRAM) TOPICAL
Qty: 30 TAB | Refills: 0 | Status: SHIPPED | OUTPATIENT
Start: 2017-03-26

## 2017-03-26 RX ADMIN — OXYCODONE HYDROCHLORIDE AND ACETAMINOPHEN 1 TABLET: 7.5; 325 TABLET ORAL at 02:30

## 2017-03-26 RX ADMIN — DOCUSATE SODIUM 100 MG: 100 CAPSULE, LIQUID FILLED ORAL at 08:14

## 2017-03-26 RX ADMIN — PANTOPRAZOLE SODIUM 40 MG: 40 TABLET, DELAYED RELEASE ORAL at 09:56

## 2017-03-26 RX ADMIN — FUROSEMIDE 20 MG: 20 TABLET ORAL at 08:14

## 2017-03-26 RX ADMIN — OXYCODONE HYDROCHLORIDE AND ACETAMINOPHEN 1 TABLET: 7.5; 325 TABLET ORAL at 08:14

## 2017-03-26 RX ADMIN — OXYCODONE HYDROCHLORIDE AND ACETAMINOPHEN 1 TABLET: 7.5; 325 TABLET ORAL at 13:14

## 2017-03-26 RX ADMIN — LABETALOL HYDROCHLORIDE 200 MG: 200 TABLET, FILM COATED ORAL at 08:14

## 2017-03-26 RX ADMIN — MAGNESIUM SULFATE HEPTAHYDRATE 2 G/HR: 40 INJECTION, SOLUTION INTRAVENOUS at 10:23

## 2017-03-26 NOTE — DISCHARGE SUMMARY
Obstetrical Discharge Summary     Name: Hoda Lam MRN: [id number]  SSN: [id number]    YOB: 1980  Age: 29 y.o. Sex: female      Admit Date: 3/24/2017    Discharge Date: 3/26/2017     Admitting Physician: Tete No DO     Attending Physician:  Micah Ware DO     * Admission Diagnoses: gestational htn;gestational htn    * Discharge Diagnoses:   Information for the patient's :  Herve Pizano [[id number]]   Delivery of a 7 lb 2.3 oz (3.24 kg) male infant via , Low Transverse on 3/20/2017 at 12:39 PM  by . Apgars were 9 and 9. Additional Diagnoses:   Hospital Problems as of 3/26/2017  Date Reviewed: 3/16/2017          Codes Class Noted - Resolved POA    * (Principal)Preeclampsia in postpartum period ICD-10-CM: O14.95  ICD-9-CM: 642.44  3/25/2017 - Present Unknown             Lab Results   Component Value Date/Time    ABO/Rh(D) O POSITIVE 2017 10:30 AM    Rubella, External immune 2011    GrBStrep, External negative 2011    ABO,Rh O + 2011      Immunization History   Administered Date(s) Administered    TDAP Vaccine 10/13/2011    Tdap 10/13/2011, 2012, 2017       * Procedures: echo, iv mag, antihypertensive medications, labs            * Discharge Condition: good    * Hospital Course: Pt returned to ER with elevated blood pressure and slightly elevated bnp. She had a normal echo and had magnesium IV bolus and adjusted antihypertensive meds. She was deemed stable for discharge on hd#2 with office follow up. * Disposition: Home    Discharge Medications:   Current Discharge Medication List      START taking these medications    Details   ferrous sulfate 325 mg (65 mg iron) tablet Take 1 Tab by mouth Daily (before breakfast). Qty: 30 Tab, Refills: 0         CONTINUE these medications which have CHANGED    Details   labetalol (NORMODYNE) 200 mg tablet Take 1 Tab by mouth three (3) times daily.   Qty: 90 Tab, Refills: 0 CONTINUE these medications which have NOT CHANGED    Details   oxyCODONE-acetaminophen (PERCOCET 7.5) 7.5-325 mg per tablet Take 1-2 Tabs by mouth every four (4) hours as needed. Max Daily Amount: 12 Tabs. Qty: 60 Tab, Refills: 0    Associated Diagnoses: Supervision of elderly multigravida (>=28years old at time of delivery), third trimester; Chronic hypertension; H/O  section; Mild intermittent asthma without complication; Sciatica, unspecified laterality; History of 2  sections      albuterol (PROAIR HFA) 90 mcg/actuation inhaler Take 2 Puffs by inhalation. omeprazole (PRILOSEC) 10 mg capsule Take 10 mg by mouth daily. prenatal vit-iron fumarate-fa (PRENATAL S) 27-0.8 mg Tab tablet Take 1 Tab by mouth daily. STOP taking these medications       ibuprofen (MOTRIN) 800 mg tablet Comments:   Reason for Stopping:         cetirizine (ZYRTEC) 10 mg tablet Comments:   Reason for Stopping:               * Follow-up Care/Patient Instructions:   Activity: No driving for 2 weeks  Diet: Regular Diet  Wound Care: As directed    Follow-up Information     Follow up With Details Comments Contact Info    Arlyn Stallings, [address] [facility] [address]  [address] [address] [phone]             Signed By:  Sonal Torres DO     2017

## 2017-03-26 NOTE — PROGRESS NOTES
OBG/GYN Progress Note    Patient doing well post-op day 6 from  without significant complaints. She is having some headaches-  Thinking it is from not sleeping well. Pain controlled on current medication. Voiding without difficulty. Patient is passing Flatus. Vitals:  Blood pressure 125/64, pulse 75, temperature 97.7 °F (36.5 °C), resp. rate 16, height 5' 5\" (1.651 m), weight 235 lb 14.4 oz (107 kg), last menstrual period 2016, SpO2 96 %, currently breastfeeding. Temp (24hrs), Av.4 °F (36.3 °C), Min:96.8 °F (36 °C), Max:97.9 °F (36.6 °C)        Exam:  Patient without distress. Heart rrr  Lungs cta b&s                Abdomen soft,  nontender. Incision c/d/i               Incision dry and clean without erythema. Lower extremities are negative for swelling, cords or tenderness. Labs:   Recent Results (from the past 24 hour(s))   METABOLIC PANEL, COMPREHENSIVE    Collection Time: 17  5:46 AM   Result Value Ref Range    Sodium 140 136 - 145 mmol/L    Potassium 3.8 3.5 - 5.1 mmol/L    Chloride 103 98 - 107 mmol/L    CO2 27 21 - 32 mmol/L    Anion gap 10 7 - 16 mmol/L    Glucose 84 65 - 100 mg/dL    BUN 13 6 - 23 MG/DL    Creatinine 0.83 0.6 - 1.0 MG/DL    GFR est AA >60 >60 ml/min/1.73m2    GFR est non-AA >60 >60 ml/min/1.73m2    Calcium 8.4 8.3 - 10.4 MG/DL    Bilirubin, total 0.2 0.2 - 1.1 MG/DL    ALT (SGPT) 50 12 - 65 U/L    AST (SGOT) 34 15 - 37 U/L    Alk.  phosphatase 122 50 - 136 U/L    Protein, total 7.0 6.3 - 8.2 g/dL    Albumin 2.7 (L) 3.5 - 5.0 g/dL    Globulin 4.3 (H) 2.3 - 3.5 g/dL    A-G Ratio 0.6 (L) 1.2 - 3.5     CBC W/O DIFF    Collection Time: 17  5:46 AM   Result Value Ref Range    WBC 7.5 4.3 - 11.1 K/uL    RBC 3.65 (L) 4.05 - 5.25 M/uL    HGB 9.5 (L) 11.7 - 15.4 g/dL    HCT 31.7 (L) 35.8 - 46.3 %    MCV 86.8 79.6 - 97.8 FL    MCH 26.0 (L) 26.1 - 32.9 PG    MCHC 30.0 (L) 31.4 - 35.0 g/dL    RDW 15.0 (H) 11.9 - 14.6 % PLATELET 022 513 - 584 K/uL    MPV 9.3 (L) 10.8 - 14.1 FL     Echo with ef of 55-60%. Reassured pt. Assessment and Plan: s/p  with fluid retention - mild pree. Did receive mag bolus and antihypertensive meds. Stable for discharge.

## 2017-03-26 NOTE — DISCHARGE INSTRUCTIONS
DISCHARGE SUMMARY from Nurse    The following personal items are in your possession at time of discharge:    Dental Appliances: None  Visual Aid: Glasses, With patient     Home Medications: None  Jewelry: Earrings  Clothing: Shirt, Pants  Other Valuables: Cell Phone, Wallet             PATIENT INSTRUCTIONS:    After general anesthesia or intravenous sedation, for 24 hours or while taking prescription Narcotics:  · Limit your activities  · Do not drive and operate hazardous machinery  · Do not make important personal or business decisions  · Do  not drink alcoholic beverages  · If you have not urinated within 8 hours after discharge, please contact your surgeon on call. Report the following to your surgeon:  · Excessive pain, swelling, redness or odor of or around the surgical area  · Temperature over 100.5  · Nausea and vomiting lasting longer than 4 hours or if unable to take medications  · Any signs of decreased circulation or nerve impairment to extremity: change in color, persistent  numbness, tingling, coldness or increase pain  · Any questions        What to do at Home:  Recommended activity: Activity as tolerated, and as told by your doctor. If you experience any of the following symptoms listed below, please follow up with your doctor. *  Please give a list of your current medications to your Primary Care Provider. *  Please update this list whenever your medications are discontinued, doses are      changed, or new medications (including over-the-counter products) are added. *  Please carry medication information at all times in case of emergency situations. These are general instructions for a healthy lifestyle:    No smoking/ No tobacco products/ Avoid exposure to second hand smoke    Surgeon General's Warning:  Quitting smoking now greatly reduces serious risk to your health.     Obesity, smoking, and sedentary lifestyle greatly increases your risk for illness    A healthy diet, regular physical exercise & weight monitoring are important for maintaining a healthy lifestyle    You may be retaining fluid if you have a history of heart failure or if you experience any of the following symptoms:  Weight gain of 3 pounds or more overnight or 5 pounds in a week, increased swelling in our hands or feet or shortness of breath while lying flat in bed. Please call your doctor as soon as you notice any of these symptoms; do not wait until your next office visit. Recognize signs and symptoms of STROKE:    F-face looks uneven    A-arms unable to move or move unevenly    S-speech slurred or non-existent    T-time-call 911 as soon as signs and symptoms begin-DO NOT go       Back to bed or wait to see if you get better-TIME IS BRAIN. Warning Signs of HEART ATTACK     Call 911 if you have these symptoms:   Chest discomfort. Most heart attacks involve discomfort in the center of the chest that lasts more than a few minutes, or that goes away and comes back. It can feel like uncomfortable pressure, squeezing, fullness, or pain.  Discomfort in other areas of the upper body. Symptoms can include pain or discomfort in one or both arms, the back, neck, jaw, or stomach.  Shortness of breath with or without chest discomfort.  Other signs may include breaking out in a cold sweat, nausea, or lightheadedness. Don't wait more than five minutes to call 911 - MINUTES MATTER! Fast action can save your life. Calling 911 is almost always the fastest way to get lifesaving treatment. Emergency Medical Services staff can begin treatment when they arrive -- up to an hour sooner than if someone gets to the hospital by car. The discharge information has been reviewed with the patient. The patient verbalized understanding. Discharge medications reviewed with the patient     Learning About Preeclampsia After Childbirth  What is preeclampsia?   A woman with preeclampsia has blood pressure that is higher than usual. She may also have other serious symptoms. Preeclampsia can be dangerous. When it is severe, it can cause seizures (eclampsia) or liver or kidney damage. When the liver is affected, some women get HELLP syndrome, a blood-clotting and bleeding problem. HELLP can come on quickly and can be deadly. This is why your doctor checks you and your baby often. Preeclampsia usually occurs after 20 weeks of pregnancy. In rare cases, it is first noted right after childbirth. Most often, it starts near the end of pregnancy and goes away after childbirth. What are the symptoms? Mild preeclampsia usually doesn't cause symptoms. But preeclampsia can cause rapid weight gain and sudden swelling of the hands and face. Severe preeclampsia does cause symptoms. It can cause a very bad headache and trouble seeing and breathing. It also can cause belly pain. You may also urinate less than usual.  If you have new preeclampsia symptoms after you go home from the hospital, call your doctor right away. What can you expect after you have had preeclampsia? In the hospital  After the baby and the placenta are delivered, preeclampsia usually starts to improve. Most women get better in the first few days after childbirth. After having preeclampsia, you still have a risk of seizures for a day or more after childbirth. (Very rarely, seizures happen later on.) So your doctor may have you take magnesium sulfate for a day or more to prevent seizures. You may also take medicine to lower your blood pressure. When you go home  Your blood pressure will most likely return to normal a few days after delivery. Your doctor will want to check your blood pressure sometime in the first week after you leave the hospital.  Some women still have high blood pressure 6 weeks after childbirth. But most return to normal levels over the long term. · Take and record your blood pressure at home if your doctor tells you to.   ¨ Learn the importance of the two measures of blood pressure (such as 120 over 80, or 120/80). The first number is the systolic pressure. This is the force of blood on the artery walls as the heart pumps. The second number is the diastolic pressure. This is the force of blood on the artery walls between heartbeats, when the heart is at rest. You have a choice of monitors to use. § Manual monitor: You pump up the cuff and use a stethoscope to listen for your pulse. § Electronic monitor: The cuff inflates, and a gauge shows your pulse rate. ¨ To take your blood pressure:  § Ask your doctor to check your blood pressure monitor to be sure that it is accurate and that the cuff fits you. Also ask your doctor to watch you use it, to make sure that you are using it right. § You should not eat, use tobacco products, or use medicine known to raise blood pressure (such as some nasal decongestant sprays) before you take your blood pressure. § Avoid taking your blood pressure if you have just exercised or are nervous or upset. Rest at least 15 minutes before you take your blood pressure. · Be safe with medicines. If you take medicine, take it exactly as prescribed. Call your doctor if you think you are having a problem with your medicine. · Do not smoke. Quitting smoking will help lower your blood pressure and improve your baby's growth and health. If you need help quitting, talk to your doctor about stop-smoking programs and medicines. These can increase your chances of quitting for good. · Eat a balanced and healthy diet that has lots of fruits and vegetables. Long-term health  After you have had preeclampsia, you have a higher-than-average risk of heart disease, stroke, and kidney disease. This may be because the same things that cause preeclampsia also cause heart and kidney disease. To protect your health, work with your doctor on living a heart-healthy lifestyle and getting the checkups you need.  Your doctor may also want you to check your blood pressure at home. Follow-up care is a key part of your treatment and safety. Be sure to make and go to all appointments, and call your doctor if you are having problems. It's also a good idea to know your test results and keep a list of the medicines you take. Where can you learn more? Go to http://orlin-vitaliy.info/. Enter L113 in the search box to learn more about \"Learning About Preeclampsia After Childbirth. \"  Current as of: May 30, 2016  Content Version: 11.1  © 6114-8030 [employer]. Care instructions adapted under license by [employer] (which disclaims liability or warranty for this information). If you have questions about a medical condition or this instruction, always ask your healthcare professional. [address] any warranty or liability for your use of this information. and appropriate educational materials and side effects teaching were provided.

## 2017-03-26 NOTE — PROGRESS NOTES
Pt assessment completed. Alert and oriented. Lung CTA even and unlabored. Heart sound regular. Abdomen soft and non tender with active bowel sounds. IV present with no s/sx of complications at this time. Low transverse incision noted on lower abdomen with derma bond, clean dry and intact. All needs met at this time, will continue to monitor.

## 2017-03-26 NOTE — PROGRESS NOTES
Pt c/o 4/10 pain in her head administered percocet 7.5 mg PO per MD order, will continue to monitor.

## 2017-03-26 NOTE — PROGRESS NOTES
Pt discharged home, iv removed cath tip intact, verbalized understanding of discharge instructions. Pt being wheeled out now, no distress noted.

## 2017-03-26 NOTE — PROGRESS NOTES
7.5 mg Percocet given PO as ordered for headache and incisional pain rated 7/10.  See [facility]

## 2017-03-26 NOTE — PROGRESS NOTES
Assessment completed via flowsheet. Patient alert and oriented x4. Patient has 5 day old infant son at home, currently pumping breast milk. Heart and lung sounds clear, regular, and unlabored. Abd soft, slightly tender with active bowel sounds x4 quadrants. Resolving low transverse incision to abd with Dermabond, c/d/i. 1+ non-pitting edema to BLE. IV site c/d/i. Patient denies pain at this time. Instructed to call with needs. Bed low and locked, call light within reach.

## (undated) DEVICE — REM POLYHESIVE ADULT PATIENT RETURN ELECTRODE: Brand: VALLEYLAB

## (undated) DEVICE — SUTURE 2-0 L36IN ABSRB BRN CT L40MM 1/2 CIR TAPERPOINT SGL 913H

## (undated) DEVICE — SUTURE MCRYL SZ 3-0 L27IN ABSRB UD L60MM KS STR REV CUT Y523H

## (undated) DEVICE — SOLUTION IRRIG 1000ML H2O STRL BLT

## (undated) DEVICE — PENCIL ES L3M BTTN SWCH S STL HEX LOK BLDE ELECTRD HOLSTER

## (undated) DEVICE — STERILE POLYISOPRENE POWDER-FREE SURGICAL GLOVES: Brand: PROTEXIS

## (undated) DEVICE — SUT CHRMC 1 36IN CTX BRN --

## (undated) DEVICE — CATH FOL TY IC BAG 16FR 2000ML -- CONVERT TO ITEM 363158

## (undated) DEVICE — SUTURE PLN GUT SZ 2-0 L27IN ABSRB YELLOWISH TAN L40MM CT 853H

## (undated) DEVICE — SURGICAL PROCEDURE PACK C SECT CDS

## (undated) DEVICE — SOLUTION IV 1000ML 0.9% SOD CHL

## (undated) DEVICE — (D)PREP SKN CHLRAPRP APPL 26ML -- CONVERT TO ITEM 371833

## (undated) DEVICE — KENDALL SCD EXPRESS SLEEVES, KNEE LENGTH, MEDIUM: Brand: KENDALL SCD

## (undated) DEVICE — ADHESIVE TISS DERMA FLEX 0.7ML -- HIGH VISCOSITY

## (undated) DEVICE — MEDI-VAC NON-CONDUCTIVE SUCTION TUBING: Brand: CARDINAL HEALTH